# Patient Record
Sex: FEMALE | Race: WHITE | NOT HISPANIC OR LATINO
[De-identification: names, ages, dates, MRNs, and addresses within clinical notes are randomized per-mention and may not be internally consistent; named-entity substitution may affect disease eponyms.]

---

## 2018-02-12 ENCOUNTER — APPOINTMENT (OUTPATIENT)
Dept: GASTROENTEROLOGY | Facility: CLINIC | Age: 33
End: 2018-02-12
Payer: COMMERCIAL

## 2018-02-12 VITALS
RESPIRATION RATE: 14 BRPM | HEIGHT: 64 IN | DIASTOLIC BLOOD PRESSURE: 83 MMHG | BODY MASS INDEX: 34.49 KG/M2 | WEIGHT: 202 LBS | SYSTOLIC BLOOD PRESSURE: 139 MMHG | HEART RATE: 80 BPM

## 2018-02-12 DIAGNOSIS — A09 INFECTIOUS GASTROENTERITIS AND COLITIS, UNSPECIFIED: ICD-10-CM

## 2018-02-12 DIAGNOSIS — F17.210 NICOTINE DEPENDENCE, CIGARETTES, UNCOMPLICATED: ICD-10-CM

## 2018-02-12 DIAGNOSIS — Z80.51 FAMILY HISTORY OF MALIGNANT NEOPLASM OF KIDNEY: ICD-10-CM

## 2018-02-12 DIAGNOSIS — Z78.9 OTHER SPECIFIED HEALTH STATUS: ICD-10-CM

## 2018-02-12 PROCEDURE — 82272 OCCULT BLD FECES 1-3 TESTS: CPT

## 2018-02-12 PROCEDURE — 99204 OFFICE O/P NEW MOD 45 MIN: CPT

## 2018-02-13 LAB
ALBUMIN SERPL ELPH-MCNC: 4.3 G/DL
ALP BLD-CCNC: 68 U/L
ALT SERPL-CCNC: 16 U/L
ANION GAP SERPL CALC-SCNC: 11 MMOL/L
AST SERPL-CCNC: 19 U/L
BILIRUB SERPL-MCNC: 0.4 MG/DL
BUN SERPL-MCNC: 12 MG/DL
CALCIUM SERPL-MCNC: 9.2 MG/DL
CHLORIDE SERPL-SCNC: 102 MMOL/L
CO2 SERPL-SCNC: 26 MMOL/L
CREAT SERPL-MCNC: 0.78 MG/DL
CRP SERPL-MCNC: <0.2 MG/DL
ERYTHROCYTE [SEDIMENTATION RATE] IN BLOOD BY WESTERGREN METHOD: 17 MM/HR
GLUCOSE SERPL-MCNC: 90 MG/DL
IGA SER QL IEP: 176 MG/DL
POTASSIUM SERPL-SCNC: 4.4 MMOL/L
PROT SERPL-MCNC: 7 G/DL
SODIUM SERPL-SCNC: 139 MMOL/L
TSH SERPL-ACNC: 2.72 UIU/ML

## 2018-02-14 LAB
ENDOMYSIUM IGA SER QL: NEGATIVE
ENDOMYSIUM IGA TITR SER: NORMAL

## 2018-02-15 ENCOUNTER — OTHER (OUTPATIENT)
Age: 33
End: 2018-02-15

## 2018-02-15 ENCOUNTER — RX RENEWAL (OUTPATIENT)
Age: 33
End: 2018-02-15

## 2018-02-15 DIAGNOSIS — A04.72 ENTEROCOLITIS DUE TO CLOSTRIDIUM DIFFICILE, NOT SPECIFIED AS RECURRENT: ICD-10-CM

## 2018-02-15 LAB
C DIFF TOX GENS STL QL NAA+PROBE: NORMAL
CDIFF BY PCR: DETECTED
GLIADIN IGA SER QL: <5 UNITS
GLIADIN IGG SER QL: <5 UNITS
GLIADIN PEPTIDE IGA SER-ACNC: NEGATIVE
GLIADIN PEPTIDE IGG SER-ACNC: NEGATIVE
TTG IGA SER IA-ACNC: 12.4 UNITS
TTG IGA SER-ACNC: NEGATIVE
TTG IGG SER IA-ACNC: <5 UNITS
TTG IGG SER IA-ACNC: NEGATIVE

## 2018-02-16 LAB
DEPRECATED O AND P PREP STL: NORMAL
G LAMBLIA AG STL QL: NORMAL

## 2018-02-25 LAB
ANNOTATION COMMENT IMP: NORMAL
BACTERIA STL CULT: NORMAL
HLA-DQ2: NEGATIVE
HLA-DQ8 QL: NEGATIVE
REF LAB TEST METHOD: NORMAL

## 2018-02-26 ENCOUNTER — OTHER (OUTPATIENT)
Age: 33
End: 2018-02-26

## 2018-02-26 ENCOUNTER — EMERGENCY (EMERGENCY)
Facility: HOSPITAL | Age: 33
LOS: 1 days | Discharge: HOME | End: 2018-02-26
Attending: EMERGENCY MEDICINE | Admitting: EMERGENCY MEDICINE
Payer: COMMERCIAL

## 2018-02-26 VITALS
TEMPERATURE: 99 F | HEART RATE: 90 BPM | RESPIRATION RATE: 18 BRPM | SYSTOLIC BLOOD PRESSURE: 127 MMHG | DIASTOLIC BLOOD PRESSURE: 78 MMHG | OXYGEN SATURATION: 98 %

## 2018-02-26 VITALS
SYSTOLIC BLOOD PRESSURE: 132 MMHG | WEIGHT: 184.97 LBS | HEART RATE: 97 BPM | DIASTOLIC BLOOD PRESSURE: 61 MMHG | OXYGEN SATURATION: 96 % | TEMPERATURE: 98 F

## 2018-02-26 DIAGNOSIS — R19.7 DIARRHEA, UNSPECIFIED: ICD-10-CM

## 2018-02-26 DIAGNOSIS — Z79.899 OTHER LONG TERM (CURRENT) DRUG THERAPY: ICD-10-CM

## 2018-02-26 DIAGNOSIS — A04.72 ENTEROCOLITIS DUE TO CLOSTRIDIUM DIFFICILE, NOT SPECIFIED AS RECURRENT: ICD-10-CM

## 2018-02-26 DIAGNOSIS — R63.4 ABNORMAL WEIGHT LOSS: ICD-10-CM

## 2018-02-26 DIAGNOSIS — R10.13 EPIGASTRIC PAIN: ICD-10-CM

## 2018-02-26 DIAGNOSIS — Z79.891 LONG TERM (CURRENT) USE OF OPIATE ANALGESIC: ICD-10-CM

## 2018-02-26 DIAGNOSIS — Z79.1 LONG TERM (CURRENT) USE OF NON-STEROIDAL ANTI-INFLAMMATORIES (NSAID): ICD-10-CM

## 2018-02-26 DIAGNOSIS — R11.2 NAUSEA WITH VOMITING, UNSPECIFIED: ICD-10-CM

## 2018-02-26 LAB
ALBUMIN SERPL ELPH-MCNC: 4.2 G/DL — SIGNIFICANT CHANGE UP (ref 3.3–5.2)
ALP SERPL-CCNC: 57 U/L — SIGNIFICANT CHANGE UP (ref 40–120)
ALT FLD-CCNC: 18 U/L — SIGNIFICANT CHANGE UP
ANION GAP SERPL CALC-SCNC: 13 MMOL/L — SIGNIFICANT CHANGE UP (ref 5–17)
APPEARANCE UR: CLEAR — SIGNIFICANT CHANGE UP
AST SERPL-CCNC: 18 U/L — SIGNIFICANT CHANGE UP
BACTERIA # UR AUTO: ABNORMAL
BASOPHILS # BLD AUTO: 0 K/UL — SIGNIFICANT CHANGE UP (ref 0–0.2)
BASOPHILS NFR BLD AUTO: 0.5 % — SIGNIFICANT CHANGE UP (ref 0–2)
BILIRUB SERPL-MCNC: 0.2 MG/DL — LOW (ref 0.4–2)
BILIRUB UR-MCNC: NEGATIVE — SIGNIFICANT CHANGE UP
BUN SERPL-MCNC: 9 MG/DL — SIGNIFICANT CHANGE UP (ref 8–20)
CALCIUM SERPL-MCNC: 8.9 MG/DL — SIGNIFICANT CHANGE UP (ref 8.6–10.2)
CHLORIDE SERPL-SCNC: 103 MMOL/L — SIGNIFICANT CHANGE UP (ref 98–107)
CO2 SERPL-SCNC: 24 MMOL/L — SIGNIFICANT CHANGE UP (ref 22–29)
COLOR SPEC: YELLOW — SIGNIFICANT CHANGE UP
CREAT SERPL-MCNC: 0.59 MG/DL — SIGNIFICANT CHANGE UP (ref 0.5–1.3)
DIFF PNL FLD: NEGATIVE — SIGNIFICANT CHANGE UP
EOSINOPHIL # BLD AUTO: 0 K/UL — SIGNIFICANT CHANGE UP (ref 0–0.5)
EOSINOPHIL NFR BLD AUTO: 0.7 % — SIGNIFICANT CHANGE UP (ref 0–6)
EPI CELLS # UR: SIGNIFICANT CHANGE UP
GLUCOSE SERPL-MCNC: 92 MG/DL — SIGNIFICANT CHANGE UP (ref 70–115)
GLUCOSE UR QL: NEGATIVE MG/DL — SIGNIFICANT CHANGE UP
HCG UR QL: NEGATIVE — SIGNIFICANT CHANGE UP
HCT VFR BLD CALC: 42.5 % — SIGNIFICANT CHANGE UP (ref 37–47)
HGB BLD-MCNC: 13.8 G/DL — SIGNIFICANT CHANGE UP (ref 12–16)
KETONES UR-MCNC: NEGATIVE — SIGNIFICANT CHANGE UP
LEUKOCYTE ESTERASE UR-ACNC: ABNORMAL
LYMPHOCYTES # BLD AUTO: 1.4 K/UL — SIGNIFICANT CHANGE UP (ref 1–4.8)
LYMPHOCYTES # BLD AUTO: 25 % — SIGNIFICANT CHANGE UP (ref 20–55)
MCHC RBC-ENTMCNC: 27.7 PG — SIGNIFICANT CHANGE UP (ref 27–31)
MCHC RBC-ENTMCNC: 32.5 G/DL — SIGNIFICANT CHANGE UP (ref 32–36)
MCV RBC AUTO: 85.3 FL — SIGNIFICANT CHANGE UP (ref 81–99)
MONOCYTES # BLD AUTO: 0.4 K/UL — SIGNIFICANT CHANGE UP (ref 0–0.8)
MONOCYTES NFR BLD AUTO: 6.8 % — SIGNIFICANT CHANGE UP (ref 3–10)
NEUTROPHILS # BLD AUTO: 3.7 K/UL — SIGNIFICANT CHANGE UP (ref 1.8–8)
NEUTROPHILS NFR BLD AUTO: 67 % — SIGNIFICANT CHANGE UP (ref 37–73)
NITRITE UR-MCNC: NEGATIVE — SIGNIFICANT CHANGE UP
PH UR: 7 — SIGNIFICANT CHANGE UP (ref 5–8)
PLATELET # BLD AUTO: 237 K/UL — SIGNIFICANT CHANGE UP (ref 150–400)
POTASSIUM SERPL-MCNC: 4.3 MMOL/L — SIGNIFICANT CHANGE UP (ref 3.5–5.3)
POTASSIUM SERPL-SCNC: 4.3 MMOL/L — SIGNIFICANT CHANGE UP (ref 3.5–5.3)
PROT SERPL-MCNC: 6.9 G/DL — SIGNIFICANT CHANGE UP (ref 6.6–8.7)
PROT UR-MCNC: NEGATIVE MG/DL — SIGNIFICANT CHANGE UP
RBC # BLD: 4.98 M/UL — SIGNIFICANT CHANGE UP (ref 4.4–5.2)
RBC # FLD: 13.6 % — SIGNIFICANT CHANGE UP (ref 11–15.6)
RBC CASTS # UR COMP ASSIST: NEGATIVE /HPF — SIGNIFICANT CHANGE UP (ref 0–4)
SODIUM SERPL-SCNC: 140 MMOL/L — SIGNIFICANT CHANGE UP (ref 135–145)
SP GR SPEC: 1.01 — SIGNIFICANT CHANGE UP (ref 1.01–1.02)
UROBILINOGEN FLD QL: NEGATIVE MG/DL — SIGNIFICANT CHANGE UP
WBC # BLD: 5.6 K/UL — SIGNIFICANT CHANGE UP (ref 4.8–10.8)
WBC # FLD AUTO: 5.6 K/UL — SIGNIFICANT CHANGE UP (ref 4.8–10.8)
WBC UR QL: SIGNIFICANT CHANGE UP

## 2018-02-26 PROCEDURE — 81001 URINALYSIS AUTO W/SCOPE: CPT

## 2018-02-26 PROCEDURE — 99284 EMERGENCY DEPT VISIT MOD MDM: CPT | Mod: 25

## 2018-02-26 PROCEDURE — 96374 THER/PROPH/DIAG INJ IV PUSH: CPT

## 2018-02-26 PROCEDURE — 99284 EMERGENCY DEPT VISIT MOD MDM: CPT

## 2018-02-26 PROCEDURE — 85027 COMPLETE CBC AUTOMATED: CPT

## 2018-02-26 PROCEDURE — 80053 COMPREHEN METABOLIC PANEL: CPT

## 2018-02-26 PROCEDURE — 81025 URINE PREGNANCY TEST: CPT

## 2018-02-26 PROCEDURE — 36415 COLL VENOUS BLD VENIPUNCTURE: CPT

## 2018-02-26 RX ORDER — VANCOMYCIN HCL 1 G
1 VIAL (EA) INTRAVENOUS
Qty: 40 | Refills: 0
Start: 2018-02-26 | End: 2018-03-07

## 2018-02-26 RX ORDER — SUCRALFATE 1 G
1 TABLET ORAL ONCE
Qty: 0 | Refills: 0 | Status: COMPLETED | OUTPATIENT
Start: 2018-02-26 | End: 2018-02-26

## 2018-02-26 RX ORDER — ONDANSETRON 8 MG/1
1 TABLET, FILM COATED ORAL
Qty: 8 | Refills: 0
Start: 2018-02-26

## 2018-02-26 RX ORDER — PANTOPRAZOLE SODIUM 20 MG/1
40 TABLET, DELAYED RELEASE ORAL ONCE
Qty: 0 | Refills: 0 | Status: COMPLETED | OUTPATIENT
Start: 2018-02-26 | End: 2018-02-26

## 2018-02-26 RX ORDER — SODIUM CHLORIDE 9 MG/ML
1000 INJECTION INTRAMUSCULAR; INTRAVENOUS; SUBCUTANEOUS ONCE
Qty: 0 | Refills: 0 | Status: COMPLETED | OUTPATIENT
Start: 2018-02-26 | End: 2018-02-26

## 2018-02-26 RX ORDER — TRAMADOL HYDROCHLORIDE 50 MG/1
50 TABLET ORAL ONCE
Qty: 0 | Refills: 0 | Status: DISCONTINUED | OUTPATIENT
Start: 2018-02-26 | End: 2018-02-26

## 2018-02-26 RX ADMIN — PANTOPRAZOLE SODIUM 40 MILLIGRAM(S): 20 TABLET, DELAYED RELEASE ORAL at 10:55

## 2018-02-26 RX ADMIN — Medication 1 GRAM(S): at 14:14

## 2018-02-26 RX ADMIN — SODIUM CHLORIDE 1000 MILLILITER(S): 9 INJECTION INTRAMUSCULAR; INTRAVENOUS; SUBCUTANEOUS at 10:51

## 2018-02-26 RX ADMIN — TRAMADOL HYDROCHLORIDE 50 MILLIGRAM(S): 50 TABLET ORAL at 14:14

## 2018-02-26 NOTE — ED STATDOCS - OBJECTIVE STATEMENT
31 y/o F w/ PMHx of kidney stones and gall stones presents to ED c/o epigastric abd pain, 2 episodes of vomiting, 5 episodes of diarrhea. Pt dx with C. diff 2 weeks ago, she finished flagyl (Rx by Dr. Hester). She woke up this morning with severe abd pain. She reports sx of diarrhea, bloody stools, mild abd pain and 25 lb weight loss which began a few months before being dx with C.diff. She reports current abd pain is similar in character to previous episode. Denies coffee-ground emesis, hemoptysis, fever, chills or any other complaints at this time. No PCP. 31 y/o F w/ PMHx of kidney stones and gall stones presents to ED c/o epigastric abd pain, nausea, 2 episodes of vomiting, 5 episodes of diarrhea since this AM. Pt dx with C. diff 2 weeks ago, she finished flagyl (Rx by Dr. Hester). She reports sx of diarrhea, bloody stools, mild abd pain and 25 lb weight loss which began a few months before being dx with C.diff. Pt notes current abd pain is similar in character to previous episode. Denies coffee-ground emesis, hemoptysis, fever, chills or any other complaints at this time. No PCP at this time. 31 y/o F w/ PMHx of kidney stones and gall stones presents to ED c/o epigastric abd pain, nausea, 2 episodes of vomiting, 5 episodes of foul-smelling diarrhea since this AM. Pt dx with C. diff 2 weeks ago, she finished course of flagyl yesterday(Rx by Dr. Hester). She reports sx of diarrhea, bloody stools, mild abd pain and 25 lb weight loss which began a few months before being dx with C.diff.  Pt notes current abd pain is similar in character to previous episode. Denies coffee-ground emesis, hematemesis, fever, chills or any other complaints at this time. No PCP at this time.

## 2018-02-26 NOTE — ED STATDOCS - PROGRESS NOTE DETAILS
Still with epigastric abd pain:  will treat with tramadol and carafate.  Awaitng callback from dr goff case d/w dr goff: recommends treatment with oral vanco QID, low fiber diet and follow-up in office in 10 days

## 2018-02-26 NOTE — ED STATDOCS - CARE PLAN
Principal Discharge DX:	C. difficile colitis  Assessment and plan of treatment:	take vancomycin as prescribed four times a day.  Zofran as needed for nausea.  Low fiber diet.

## 2018-03-12 ENCOUNTER — APPOINTMENT (OUTPATIENT)
Dept: GASTROENTEROLOGY | Facility: CLINIC | Age: 33
End: 2018-03-12
Payer: COMMERCIAL

## 2018-03-12 VITALS
SYSTOLIC BLOOD PRESSURE: 133 MMHG | WEIGHT: 204 LBS | HEIGHT: 64 IN | HEART RATE: 78 BPM | BODY MASS INDEX: 34.83 KG/M2 | DIASTOLIC BLOOD PRESSURE: 78 MMHG

## 2018-03-12 PROCEDURE — 99214 OFFICE O/P EST MOD 30 MIN: CPT

## 2018-08-21 ENCOUNTER — RESULT REVIEW (OUTPATIENT)
Age: 33
End: 2018-08-21

## 2019-08-27 ENCOUNTER — RESULT REVIEW (OUTPATIENT)
Age: 34
End: 2019-08-27

## 2019-09-21 ENCOUNTER — TRANSCRIPTION ENCOUNTER (OUTPATIENT)
Age: 34
End: 2019-09-21

## 2019-12-05 ENCOUNTER — OUTPATIENT (OUTPATIENT)
Dept: OUTPATIENT SERVICES | Facility: HOSPITAL | Age: 34
LOS: 1 days | End: 2019-12-05
Payer: COMMERCIAL

## 2019-12-05 ENCOUNTER — APPOINTMENT (OUTPATIENT)
Dept: MRI IMAGING | Facility: CLINIC | Age: 34
End: 2019-12-05
Payer: COMMERCIAL

## 2019-12-05 DIAGNOSIS — Z00.00 ENCOUNTER FOR GENERAL ADULT MEDICAL EXAMINATION WITHOUT ABNORMAL FINDINGS: ICD-10-CM

## 2019-12-05 PROCEDURE — 72148 MRI LUMBAR SPINE W/O DYE: CPT

## 2019-12-05 PROCEDURE — 72148 MRI LUMBAR SPINE W/O DYE: CPT | Mod: 26

## 2020-03-31 ENCOUNTER — TRANSCRIPTION ENCOUNTER (OUTPATIENT)
Age: 35
End: 2020-03-31

## 2020-12-10 ENCOUNTER — APPOINTMENT (OUTPATIENT)
Dept: PHYSICAL MEDICINE AND REHAB | Facility: CLINIC | Age: 35
End: 2020-12-10
Payer: COMMERCIAL

## 2020-12-10 VITALS
BODY MASS INDEX: 33.32 KG/M2 | HEIGHT: 65 IN | TEMPERATURE: 97.1 F | WEIGHT: 200 LBS | DIASTOLIC BLOOD PRESSURE: 85 MMHG | SYSTOLIC BLOOD PRESSURE: 132 MMHG

## 2020-12-10 PROCEDURE — 99203 OFFICE O/P NEW LOW 30 MIN: CPT

## 2020-12-10 PROCEDURE — 99072 ADDL SUPL MATRL&STAF TM PHE: CPT

## 2020-12-10 RX ORDER — VANCOMYCIN HYDROCHLORIDE 125 MG/1
125 CAPSULE ORAL 4 TIMES DAILY
Qty: 52 | Refills: 0 | Status: DISCONTINUED | COMMUNITY
Start: 2018-02-26 | End: 2020-12-10

## 2020-12-10 RX ORDER — METRONIDAZOLE 500 MG/1
500 TABLET ORAL 3 TIMES DAILY
Qty: 30 | Refills: 0 | Status: DISCONTINUED | COMMUNITY
Start: 2018-02-15 | End: 2020-12-10

## 2020-12-10 NOTE — ASSESSMENT
[FreeTextEntry1] : NAHOMY JOHNS,  a 35 year-old female here with lumbar radiculopathy.\par Discussed all potential treatment options including PT, oral medications, and interventional procedures\par - will trial medrol - advised to stop all NSAID use during taper, and resume PRN after.  Cautioned in regards to chronic oral steroids and NSAIDs with risk of GI/cardiac/renal toxicities\par - Start PT as tolerated \par - WIll order for MRI - last MRI was 12/2019 at the time of 1st fall, however has different presentation now with neurological deficits after fall in 9/2020\par - briefly discuss interventional procedure - MO[facility]STO \par - Will review MRI result via phone

## 2020-12-10 NOTE — DATA REVIEWED
[MRI] : MRI [FreeTextEntry1] : Lumbar MRI - 12/2019\par Lumbar alignment is maintained. Vertebral bodies are normal in height and \par signal. Intervertebral disc spaces are normal in height and signal. No \par significant spinal canal or neuroforaminal narrowing. \par The conus is normal in position and morphology at the T12-L1 level. \par IMPRESSION: No bone marrow edema. Lumbar alignment is maintained.

## 2020-12-10 NOTE — PHYSICAL EXAM
[FreeTextEntry1] : General: NAD, alert and oriented x 3\par Psych: normal affect, intact judgment and insight\par HEENT: NC/AT, normal visual tracking\par Pulmonary: normal respiratory effort, chest expansion appears symmetrical\par CV: extremities appear pink and well perfused\par Abd: non-distended\par Ext: no c/c/e\par skin: normal color, appearance, and temperature\par \par Lumbar/Hip Spine:\par Gait - non-antalgic, able to heel and toe walk\par Inspection: normal muscle bulk without asymmetry\par Palpation: TTP over left lumbar paraspinal\par ROM lumbar: 1/2 flexion before pain, extension with minimal discomfort\par MMT: 5/5 bilateral lower extremities - except LEFT 4+/5 HF, 5-/5 KE\par Reflexes: symmetric bilateral patella and ankle jerk\par Sensory: intact to light touch in all dermatomes of the bilateral lower extremities\par Provocative testing:\par Facet loading - negative\par SLR +Pos left\par HARLEEN/FADIR - negative

## 2020-12-10 NOTE — HISTORY OF PRESENT ILLNESS
[FreeTextEntry1] : Ms. NAHOMY JOHNS is a 35 year old female with \par \par Location: LLE > low back\par Inciting Event: initial fall 12/2019 with soreness but resolved.  Slip and fell again 9/2020 now with radicular pain and weakness\par Onset: acute\par Frequency: fairly constant\par Quality: sharp, shooting, throbbing\par Severity: 8-10/10\par Aggravating Factor: bending, lifting, walking, prolong sitting\par Relieving factor: rest (mildly helps)\par Radiation: left L5 distribution\par Numbness/Tingling: same distribution\par Cough/Sneezing: increased pain\par Bowel/Bladder incontinence: denies - no saddle anesthesia either\par Extremity weakness: left leg - buckling + near falls\par Conservative tx: OTC NSAID - no benefits.

## 2020-12-13 ENCOUNTER — APPOINTMENT (OUTPATIENT)
Dept: MRI IMAGING | Facility: CLINIC | Age: 35
End: 2020-12-13
Payer: COMMERCIAL

## 2020-12-13 ENCOUNTER — OUTPATIENT (OUTPATIENT)
Dept: OUTPATIENT SERVICES | Facility: HOSPITAL | Age: 35
LOS: 1 days | End: 2020-12-13
Payer: COMMERCIAL

## 2020-12-13 DIAGNOSIS — M54.16 RADICULOPATHY, LUMBAR REGION: ICD-10-CM

## 2020-12-13 DIAGNOSIS — Z00.8 ENCOUNTER FOR OTHER GENERAL EXAMINATION: ICD-10-CM

## 2020-12-13 PROCEDURE — 72148 MRI LUMBAR SPINE W/O DYE: CPT | Mod: 26

## 2020-12-13 PROCEDURE — 72148 MRI LUMBAR SPINE W/O DYE: CPT

## 2020-12-17 ENCOUNTER — APPOINTMENT (OUTPATIENT)
Dept: PHYSICAL MEDICINE AND REHAB | Facility: CLINIC | Age: 35
End: 2020-12-17
Payer: COMMERCIAL

## 2020-12-17 VITALS
TEMPERATURE: 97.6 F | SYSTOLIC BLOOD PRESSURE: 141 MMHG | WEIGHT: 200 LBS | DIASTOLIC BLOOD PRESSURE: 82 MMHG | HEIGHT: 65 IN | BODY MASS INDEX: 33.32 KG/M2

## 2020-12-17 PROCEDURE — 99072 ADDL SUPL MATRL&STAF TM PHE: CPT

## 2020-12-17 PROCEDURE — 99214 OFFICE O/P EST MOD 30 MIN: CPT

## 2020-12-17 RX ORDER — GABAPENTIN 300 MG/1
300 CAPSULE ORAL 3 TIMES DAILY
Qty: 90 | Refills: 2 | Status: ACTIVE | COMMUNITY
Start: 2020-12-17 | End: 1900-01-01

## 2020-12-22 NOTE — ASSESSMENT
[FreeTextEntry1] : Left L4/5, L5/S1 TFESI with Kenalog. \par \par \par Epidural steroid injection is medically necessary given the persistent and severe low back pain. Patient complains of radicular pain down to left leg. MRI scan is consistent with Lumbar Radiculopathy. The symptom greatly affect patient's daily life and function. Patient's symptom and physical examination are consistent with the diagnosis of lumbar radiculopathy. Patient has failed physician guided conservative treatments for over 6 weeks therefore an epidural steroid injection is the next step in management of the patient's symptoms.\par

## 2020-12-22 NOTE — PHYSICAL EXAM
[FreeTextEntry1] : General: NAD, alert and oriented x 3\par Psych: normal affect, intact judgment and insight\par HEENT: NC/AT, normal visual tracking\par Pulmonary: normal respiratory effort, chest expansion appears symmetrical\par CV: extremities appear pink and well perfused\par Abd: non-distended\par Ext: no c/c/e\par skin: normal color, appearance, and temperature\par \par Lumbar/Hip Spine:\par Gait - non-antalgic, able to heel and toe walk\par Inspection: normal muscle bulk without asymmetry\par Palpation: TTP over left lumbar paraspinal\par ROM lumbar: 1/2 flexion before pain, extension with minimal discomfort\par MMT: 5/5 bilateral lower extremities - except LEFT 4+/5 HF, 5-/5 KE\par Reflexes: symmetric bilateral patella and ankle jerk\par Sensory: intact to light touch in all dermatomes of the bilateral lower extremities\par Provocative testing:\par Facet loading - negative\par SLR +Pos left\par HARLEEN/FADIR - negative.

## 2020-12-22 NOTE — HISTORY OF PRESENT ILLNESS
[FreeTextEntry1] : Patient presents for follow up. Medrol dose miko did not help. Pain is still severe. Going down LLE. Pain is a 6-9/10. Worst with activity. MRI done. Reviewed. L4/5 herniated disc with left L5 nerve impingement.

## 2020-12-26 ENCOUNTER — APPOINTMENT (OUTPATIENT)
Dept: DISASTER EMERGENCY | Facility: CLINIC | Age: 35
End: 2020-12-26

## 2020-12-26 DIAGNOSIS — Z01.818 ENCOUNTER FOR OTHER PREPROCEDURAL EXAMINATION: ICD-10-CM

## 2020-12-27 LAB — SARS-COV-2 N GENE NPH QL NAA+PROBE: NOT DETECTED

## 2020-12-28 ENCOUNTER — TRANSCRIPTION ENCOUNTER (OUTPATIENT)
Age: 35
End: 2020-12-28

## 2020-12-29 ENCOUNTER — APPOINTMENT (OUTPATIENT)
Dept: PHYSICAL MEDICINE AND REHAB | Facility: GI CENTER | Age: 35
End: 2020-12-29
Payer: COMMERCIAL

## 2020-12-29 ENCOUNTER — OUTPATIENT (OUTPATIENT)
Dept: OUTPATIENT SERVICES | Facility: HOSPITAL | Age: 35
LOS: 1 days | End: 2020-12-29
Payer: COMMERCIAL

## 2020-12-29 DIAGNOSIS — M54.16 RADICULOPATHY, LUMBAR REGION: ICD-10-CM

## 2020-12-29 PROCEDURE — 64483 NJX AA&/STRD TFRM EPI L/S 1: CPT | Mod: LT

## 2020-12-29 PROCEDURE — 64484 NJX AA&/STRD TFRM EPI L/S EA: CPT | Mod: LT

## 2020-12-29 PROCEDURE — 76000 FLUOROSCOPY <1 HR PHYS/QHP: CPT

## 2021-01-14 ENCOUNTER — APPOINTMENT (OUTPATIENT)
Dept: PHYSICAL MEDICINE AND REHAB | Facility: CLINIC | Age: 36
End: 2021-01-14
Payer: COMMERCIAL

## 2021-01-14 VITALS
TEMPERATURE: 97.2 F | BODY MASS INDEX: 33.32 KG/M2 | HEIGHT: 65 IN | DIASTOLIC BLOOD PRESSURE: 97 MMHG | HEART RATE: 74 BPM | SYSTOLIC BLOOD PRESSURE: 138 MMHG | WEIGHT: 200 LBS

## 2021-01-14 DIAGNOSIS — Z00.00 ENCOUNTER FOR GENERAL ADULT MEDICAL EXAMINATION W/OUT ABNORMAL FINDINGS: ICD-10-CM

## 2021-01-14 PROCEDURE — 99213 OFFICE O/P EST LOW 20 MIN: CPT

## 2021-01-14 PROCEDURE — 99072 ADDL SUPL MATRL&STAF TM PHE: CPT

## 2021-01-19 ENCOUNTER — APPOINTMENT (OUTPATIENT)
Dept: DISASTER EMERGENCY | Facility: CLINIC | Age: 36
End: 2021-01-19

## 2021-01-20 NOTE — HISTORY OF PRESENT ILLNESS
[FreeTextEntry1] : Patient presents for follow up and reports significant improvement in pain for about 2 days, then pain returned to pre injection level. \par \par Prior visit:\par Patient presents for follow up. Medrol dose miko did not help. Pain is still severe. Going down LLE. Pain is a 6-9/10. Worst with activity. MRI done. Reviewed. L4/5 herniated disc with left L5 nerve impingement. \par

## 2021-01-20 NOTE — PHYSICAL EXAM
[FreeTextEntry1] : General: NAD, alert and oriented x 3\par Psych: normal affect, intact judgment and insight\par HEENT: NC/AT, normal visual tracking\par Pulmonary: normal respiratory effort, chest expansion appears symmetrical\par CV: extremities appear pink and well perfused\par Abd: non-distended\par Ext: no c/c/e\par skin: normal color, appearance, and temperature\par \par Lumbar/Hip Spine:\par Gait - non-antalgic, able to heel and toe walk\par Inspection: normal muscle bulk without asymmetry\par Palpation: TTP over left lumbar paraspinal\par ROM lumbar: 1/2 flexion before pain, extension with minimal discomfort\par MMT: 5/5 bilateral lower extremities - except LEFT 4+/5 HF, 5-/5 KE\par Reflexes: symmetric bilateral patella and ankle jerk\par Sensory: intact to light touch in all dermatomes of the bilateral lower extremities\par Provocative testing:\par Facet loading - negative\par SLR +Pos left\par HARLEEN/FADIR - negative. \par

## 2021-01-26 ENCOUNTER — APPOINTMENT (OUTPATIENT)
Dept: DISASTER EMERGENCY | Facility: CLINIC | Age: 36
End: 2021-01-26

## 2021-01-27 ENCOUNTER — TRANSCRIPTION ENCOUNTER (OUTPATIENT)
Age: 36
End: 2021-01-27

## 2021-01-28 ENCOUNTER — TRANSCRIPTION ENCOUNTER (OUTPATIENT)
Age: 36
End: 2021-01-28

## 2021-01-29 ENCOUNTER — APPOINTMENT (OUTPATIENT)
Dept: PHYSICAL MEDICINE AND REHAB | Facility: GI CENTER | Age: 36
End: 2021-01-29

## 2021-03-09 ENCOUNTER — APPOINTMENT (OUTPATIENT)
Dept: PHYSICAL MEDICINE AND REHAB | Facility: CLINIC | Age: 36
End: 2021-03-09
Payer: COMMERCIAL

## 2021-03-09 VITALS
BODY MASS INDEX: 33.29 KG/M2 | HEIGHT: 64 IN | OXYGEN SATURATION: 99 % | HEART RATE: 92 BPM | SYSTOLIC BLOOD PRESSURE: 128 MMHG | DIASTOLIC BLOOD PRESSURE: 82 MMHG | WEIGHT: 195 LBS | TEMPERATURE: 98.9 F | RESPIRATION RATE: 14 BRPM

## 2021-03-09 VITALS — TEMPERATURE: 98.9 F

## 2021-03-09 PROCEDURE — 99072 ADDL SUPL MATRL&STAF TM PHE: CPT

## 2021-03-09 PROCEDURE — 99214 OFFICE O/P EST MOD 30 MIN: CPT

## 2021-03-09 RX ORDER — METHYLPREDNISOLONE 4 MG/1
4 TABLET ORAL
Qty: 1 | Refills: 0 | Status: DISCONTINUED | COMMUNITY
Start: 2020-12-10 | End: 2021-03-09

## 2021-03-09 NOTE — PHYSICAL EXAM
[FreeTextEntry1] : NAD\par A&Ox3\par Non-obese\par ROM L-spine: 1/2 full forward flexion before pain; 15-20' extension w/ pain into left leg\par ROM Hips: smooth IR/ER w/o pain\par Pelvic tilt: ++\par Seated slump test: +/- left\par SLR: +/- left\par DTR's: 2+ knees/ankles\par MMT: 5-/5 LEFT TA; 5/5 PF\par Sensation: SILT\par Toe & Heel Walk: Slight difficulty heel walking left ankle\par  \par

## 2021-03-09 NOTE — HISTORY OF PRESENT ILLNESS
[FreeTextEntry1] : 35 y.o. F w/ h/o LBP radiating down left leg presents to office to establish care.  Pt. was previously seeing Dr. Lewis who performed LEFT L4-5 + L5-S1 TFESI (12/29/20) w/o relief of sxs.  MRI L-spine previously done and reviewed below.  Pt. still c/o radiating pain emanating from left buttock down posterolateral aspect thigh past knee into lateral calf.  Endorses N/T in leg.

## 2021-03-09 NOTE — ASSESSMENT
[FreeTextEntry1] : 35 y.o. F w/ h/o LEFT L4-5 HNP -> LEFT L5 radiculopathy w/ slight DF weakness.  Rx P.T. for modalities, gentle ROM, stretching and strengthening exercises.  Discussed R/B/A to repeat LEFT L5-S1 TFESI under fluoroscopy which patient has agreed to.  She will be scheduled w/i next 2 weeks.  Further advised smoking cessation and weight loss.  RTC 2 weeks post procedure.

## 2021-03-09 NOTE — DATA REVIEWED
[MRI] : MRI [FreeTextEntry1] : MRI L-spine: Disc bulge with left paracentral disc protrusion at L4-L5 contributing to mild-to-moderate canal stenosis and mass effect on the descending left L5 nerve root.

## 2021-03-22 ENCOUNTER — APPOINTMENT (OUTPATIENT)
Dept: DISASTER EMERGENCY | Facility: CLINIC | Age: 36
End: 2021-03-22

## 2021-03-23 LAB — SARS-COV-2 N GENE NPH QL NAA+PROBE: NOT DETECTED

## 2021-03-24 ENCOUNTER — TRANSCRIPTION ENCOUNTER (OUTPATIENT)
Age: 36
End: 2021-03-24

## 2021-03-25 ENCOUNTER — OUTPATIENT (OUTPATIENT)
Dept: OUTPATIENT SERVICES | Facility: HOSPITAL | Age: 36
LOS: 1 days | End: 2021-03-25
Payer: COMMERCIAL

## 2021-03-25 ENCOUNTER — APPOINTMENT (OUTPATIENT)
Dept: PHYSICAL MEDICINE AND REHAB | Facility: GI CENTER | Age: 36
End: 2021-03-25
Payer: COMMERCIAL

## 2021-03-25 DIAGNOSIS — M54.16 RADICULOPATHY, LUMBAR REGION: ICD-10-CM

## 2021-03-25 PROCEDURE — 64483 NJX AA&/STRD TFRM EPI L/S 1: CPT | Mod: LT

## 2021-03-25 PROCEDURE — 64483 NJX AA&/STRD TFRM EPI L/S 1: CPT

## 2021-03-25 PROCEDURE — 76000 FLUOROSCOPY <1 HR PHYS/QHP: CPT

## 2021-04-13 ENCOUNTER — APPOINTMENT (OUTPATIENT)
Dept: PHYSICAL MEDICINE AND REHAB | Facility: CLINIC | Age: 36
End: 2021-04-13
Payer: COMMERCIAL

## 2021-04-13 VITALS
OXYGEN SATURATION: 99 % | BODY MASS INDEX: 33.29 KG/M2 | HEART RATE: 79 BPM | HEIGHT: 64 IN | WEIGHT: 195 LBS | RESPIRATION RATE: 14 BRPM | TEMPERATURE: 98 F | DIASTOLIC BLOOD PRESSURE: 88 MMHG | SYSTOLIC BLOOD PRESSURE: 131 MMHG

## 2021-04-13 PROCEDURE — 99072 ADDL SUPL MATRL&STAF TM PHE: CPT

## 2021-04-13 PROCEDURE — 99214 OFFICE O/P EST MOD 30 MIN: CPT

## 2021-04-13 NOTE — ASSESSMENT
[FreeTextEntry1] : 36 y.o. F w/ h/o LEFT L4-5 HNP -> LEFT L5 radiculopathy w/ slight DF weakness.  Pt. had short lived response to repeat LEFT L5-S1 TFESI but > 75%.  I spent most of today's office visit (25 min) discussing further non-operative vs. operative intervention for the patient's painful spinal condition.  I feel that a 3rd LESI is not a viable long-term solution for the patient's radicular pain.  She may be best served with a MLD but understands that she may still have some LBP.  As such, I have referred her to Ortho Spine (Dr. Smart) for consultation.   Again advised advised smoking cessation and weight loss.  RTC after above.

## 2021-04-13 NOTE — PHYSICAL EXAM
[FreeTextEntry1] : NAD\par A&Ox3\par Non-obese\par No significant change in today's examination\par ROM L-spine: 1/2 full forward flexion before pain; 15-20' extension w/ pain into left leg\par ROM Hips: smooth IR/ER w/o pain\par Pelvic tilt: ++\par Seated slump test: +/- left\par SLR: +/- left\par DTR's: 2+ knees/ankles\par MMT: 5-/5 LEFT TA; 5/5 PF (static)\par Sensation: SILT\par Toe & Heel Walk: Slight difficulty heel walking left ankle\par  \par

## 2021-04-23 ENCOUNTER — APPOINTMENT (OUTPATIENT)
Dept: ORTHOPEDIC SURGERY | Facility: CLINIC | Age: 36
End: 2021-04-23
Payer: COMMERCIAL

## 2021-04-23 VITALS
HEART RATE: 89 BPM | HEIGHT: 64 IN | TEMPERATURE: 98.1 F | SYSTOLIC BLOOD PRESSURE: 129 MMHG | WEIGHT: 198 LBS | BODY MASS INDEX: 33.8 KG/M2 | DIASTOLIC BLOOD PRESSURE: 87 MMHG

## 2021-04-23 DIAGNOSIS — M51.26 OTHER INTERVERTEBRAL DISC DISPLACEMENT, LUMBAR REGION: ICD-10-CM

## 2021-04-23 DIAGNOSIS — M54.16 RADICULOPATHY, LUMBAR REGION: ICD-10-CM

## 2021-04-23 DIAGNOSIS — M54.5 LOW BACK PAIN: ICD-10-CM

## 2021-04-23 PROCEDURE — 99204 OFFICE O/P NEW MOD 45 MIN: CPT

## 2021-04-23 PROCEDURE — 99072 ADDL SUPL MATRL&STAF TM PHE: CPT

## 2021-04-23 PROCEDURE — 72100 X-RAY EXAM L-S SPINE 2/3 VWS: CPT

## 2021-04-23 NOTE — ADDENDUM
[FreeTextEntry1] : Documented by Igor Rojo acting as a scribe for Dr. Wilfrid Smart on 04/23/2021. All medical record entries made by the Scribe were at my, Dr. Wilfrid Smart, direction and personally dictated by me on 04/23/2021 . I have reviewed the chart and agree that the record accurately reflects my personal performance of the history, physical exam, assessment and plan. I have also personally directed, reviewed, and agreed with the chart.

## 2021-04-23 NOTE — DISCUSSION/SUMMARY
[de-identified] : Prior to appointment and during encounter with patient extensive medical records were reviewed including but not limited to, hospital records, out patient records, imaging results, and lab data. During this appointment the patient was examined, diagnoses were discussed and explained in a face to face manner. In addition extensive time was spent reviewing aforementioned diagnostic studies. Counseling including abnormal image results, differential diagnoses, treatment options, risk and benefits, lifestyle changes, current condition, and current medications was performed. Patient's comments, questions, and concerns were address and patient verbalized understanding. Based on this patient's presentation at our office, which is an orthopedic spine surgeon's office, this patient inherently / intrinsically has a risk, however minute, of developing  issues such as Cauda equina syndrome, bowel and bladder changes, or progression of motor or neurological deficits such as paralysis which may be permanent.\par \par With 1 year of lumbar radiculopathy and a progressive drop foot I recommend an L4-L5 left diskectomy. Patient agrees with this plan secondary to 1 year of failed conservative care. Anatomic models, Xrays, CT scans/MRI’s were utilized to provide a firm understanding of their surgical plan. Patient is aware that surgery is elective in nature and he choosing to proceed with surgery. Risks, benefits, alternatives were discussed and all questions, comments and concerns were encouraged and answered to the patient's satisfaction. The statistical probability of improvement was discussed at length as well as post surgical course. Literature from [employer] spine society was provided to the patient regarding the specific type of surgery as well as a 5 page written surgical consent which the patient will need to sign and return to the office prior to surgical date. Consent forms highlight specific complications related to the complex nature of spinal surgery\par  \par Risks of lumbar surgery include: persistent pain, adjacent segment disease (which will require more surgery in future), dural tears, neurologic injury, and wound healing complications\par  \par Benefits of lumbar surgery include Improved neurologic and pain score\par  \par We also discussed with the patient complications of incisions directly related to obesity, diabetes, previous wound complications or post-surgical wound infections, smoking, neuropathy, and chronic anticoagulation. This risk has been specifically discussed and the patient will discuss modifiable risk factors to be optimized prior to surgical management. A multimodality approach of primary care physician, and medicine subspecialist will be utilized to optimize medical risk factors.\par  \par If patient is a smoker, discontinuation of smoking was advised and must be accomplished 6-8 weeks prior to surgery date. Patient was advised that help with quitting smoking is available through [facility] Smoker's Quit Line and phone number/website was provided, or patient can ask assistance from primary care provider. Elective surgery will not be performed unless patient complies with this policy.\par \par Medical comorbidities including but not limited to diabetes, coronary artery disease, renal insufficiency, uncontrolled hypertension, rheumatoid arthritis, auto-immune disorders, COPD/asthma and/or history of radiation, chemotherapy, being on anticoagulants, chronic steroids, immune modulators, have increased statistical chance of leading to increased hospital stay, protracted recovery period, need for acute or subacute rehabilitation post-operative. There can be increased probability of post-surgical and medical complications including but not limited to surgical site infection, need for revision surgery, deep vein thrombosis, pulmonary embolism, exacerbation of COPD/asthma, pneumonia, UTI, urinary retention, and ileus. \par

## 2021-04-23 NOTE — HISTORY OF PRESENT ILLNESS
[de-identified] : 36 year old F presents for an initial evaluation after a slip and fall in summer 2020, since this fall she has had LLE pain. She was diagnosed with a herniated disc. She completed oral medication, injection therapy, and PT. She states the sciatica is getting worse and she is developing a left foot drop. She wishes to discuss surgery, she was referred for this conversation by physiatry.  [Ataxia] : no ataxia [Incontinence] : no incontinence [Loss of Dexterity] : good dexterity [Urinary Ret.] : no urinary retention

## 2021-04-23 NOTE — PHYSICAL EXAM
[Valsalva Test] : positive Valsalva test [de-identified] : CONSTITUTIONAL: The patient is a very pleasant individual who is well-nourished and who appears stated age.\par PSYCHIATRIC: The patient is alert and oriented X 3 and in no apparent distress, and participates with orthopedic evaluation well.\par HEAD: Atraumatic and is nonsyndromic in appearance.\par EENT: No visible thyromegaly, EOMI.\par RESPIRATORY: Respiratory rate is regular, not dyspneic on examination.\par LYMPHATICS: There is no inguinal lymphadenopathy\par INTEGUMENTARY: Skin is clean, dry, and intact about the bilateral lower extremities and lumbar spine.\par VASCULAR: There is brisk capillary refill about the bilateral lower extremities.\par NEUROLOGIC: There are no pathologic reflexes.  Deep tendon reflexes are well maintained at 2+/4 of the bilateral lower extremities and are symmetric. L5 radiculopathy on the left\par MUSCULOSKELETAL: There is no visible muscular atrophy.  Range of motion of lumbar spine is well maintained. The patient ambulates in a non-myelopathic manner. Grossly positive tension sign and straight leg raise bilaterally, worse on the left. Quad extension, EHL, plantar flexion, and ankle eversion are well preserved. Normal secondary orthopaedic exam of bilateral hips, greater trochanteric area, knees and ankles. left foot drop with strength 3 to 4 /5.  [de-identified] : Xray of the lumbar spine taken 04/23/2021 demonstrates well maintained disc spaces, there is a subtle convexity to the right, no evidence of a spondylolisthesis. \par \par MRI of the lumbar spine taken at [facility] on  demonstrates L4-L5 left herniated disc

## 2021-04-28 ENCOUNTER — OUTPATIENT (OUTPATIENT)
Dept: OUTPATIENT SERVICES | Facility: HOSPITAL | Age: 36
LOS: 1 days | End: 2021-04-28
Payer: COMMERCIAL

## 2021-04-28 VITALS
WEIGHT: 209.44 LBS | HEIGHT: 64 IN | DIASTOLIC BLOOD PRESSURE: 78 MMHG | TEMPERATURE: 98 F | RESPIRATION RATE: 18 BRPM | HEART RATE: 76 BPM | SYSTOLIC BLOOD PRESSURE: 118 MMHG

## 2021-04-28 DIAGNOSIS — Z29.9 ENCOUNTER FOR PROPHYLACTIC MEASURES, UNSPECIFIED: ICD-10-CM

## 2021-04-28 DIAGNOSIS — U07.1 COVID-19: ICD-10-CM

## 2021-04-28 DIAGNOSIS — Z01.818 ENCOUNTER FOR OTHER PREPROCEDURAL EXAMINATION: ICD-10-CM

## 2021-04-28 DIAGNOSIS — M51.26 OTHER INTERVERTEBRAL DISC DISPLACEMENT, LUMBAR REGION: ICD-10-CM

## 2021-04-28 LAB
A1C WITH ESTIMATED AVERAGE GLUCOSE RESULT: 5.2 % — SIGNIFICANT CHANGE UP (ref 4–5.6)
ANION GAP SERPL CALC-SCNC: 12 MMOL/L — SIGNIFICANT CHANGE UP (ref 5–17)
APTT BLD: 30.1 SEC — SIGNIFICANT CHANGE UP (ref 27.5–35.5)
BASOPHILS # BLD AUTO: 0.04 K/UL — SIGNIFICANT CHANGE UP (ref 0–0.2)
BASOPHILS NFR BLD AUTO: 0.5 % — SIGNIFICANT CHANGE UP (ref 0–2)
BLD GP AB SCN SERPL QL: SIGNIFICANT CHANGE UP
BUN SERPL-MCNC: 11 MG/DL — SIGNIFICANT CHANGE UP (ref 8–20)
CALCIUM SERPL-MCNC: 9.3 MG/DL — SIGNIFICANT CHANGE UP (ref 8.6–10.2)
CHLORIDE SERPL-SCNC: 102 MMOL/L — SIGNIFICANT CHANGE UP (ref 98–107)
CO2 SERPL-SCNC: 24 MMOL/L — SIGNIFICANT CHANGE UP (ref 22–29)
CREAT SERPL-MCNC: 0.72 MG/DL — SIGNIFICANT CHANGE UP (ref 0.5–1.3)
EOSINOPHIL # BLD AUTO: 0.07 K/UL — SIGNIFICANT CHANGE UP (ref 0–0.5)
EOSINOPHIL NFR BLD AUTO: 0.8 % — SIGNIFICANT CHANGE UP (ref 0–6)
ESTIMATED AVERAGE GLUCOSE: 103 MG/DL — SIGNIFICANT CHANGE UP (ref 68–114)
GLUCOSE SERPL-MCNC: 86 MG/DL — SIGNIFICANT CHANGE UP (ref 70–99)
HCT VFR BLD CALC: 44.2 % — SIGNIFICANT CHANGE UP (ref 34.5–45)
HGB BLD-MCNC: 14.6 G/DL — SIGNIFICANT CHANGE UP (ref 11.5–15.5)
IMM GRANULOCYTES NFR BLD AUTO: 0.2 % — SIGNIFICANT CHANGE UP (ref 0–1.5)
INR BLD: 1 RATIO — SIGNIFICANT CHANGE UP (ref 0.88–1.16)
LYMPHOCYTES # BLD AUTO: 2.28 K/UL — SIGNIFICANT CHANGE UP (ref 1–3.3)
LYMPHOCYTES # BLD AUTO: 25.8 % — SIGNIFICANT CHANGE UP (ref 13–44)
MCHC RBC-ENTMCNC: 28.8 PG — SIGNIFICANT CHANGE UP (ref 27–34)
MCHC RBC-ENTMCNC: 33 GM/DL — SIGNIFICANT CHANGE UP (ref 32–36)
MCV RBC AUTO: 87.2 FL — SIGNIFICANT CHANGE UP (ref 80–100)
MONOCYTES # BLD AUTO: 0.41 K/UL — SIGNIFICANT CHANGE UP (ref 0–0.9)
MONOCYTES NFR BLD AUTO: 4.6 % — SIGNIFICANT CHANGE UP (ref 2–14)
MRSA PCR RESULT.: SIGNIFICANT CHANGE UP
NEUTROPHILS # BLD AUTO: 6.01 K/UL — SIGNIFICANT CHANGE UP (ref 1.8–7.4)
NEUTROPHILS NFR BLD AUTO: 68.1 % — SIGNIFICANT CHANGE UP (ref 43–77)
PLATELET # BLD AUTO: 279 K/UL — SIGNIFICANT CHANGE UP (ref 150–400)
POTASSIUM SERPL-MCNC: 4.5 MMOL/L — SIGNIFICANT CHANGE UP (ref 3.5–5.3)
POTASSIUM SERPL-SCNC: 4.5 MMOL/L — SIGNIFICANT CHANGE UP (ref 3.5–5.3)
PROTHROM AB SERPL-ACNC: 11.6 SEC — SIGNIFICANT CHANGE UP (ref 10.6–13.6)
RBC # BLD: 5.07 M/UL — SIGNIFICANT CHANGE UP (ref 3.8–5.2)
RBC # FLD: 12.9 % — SIGNIFICANT CHANGE UP (ref 10.3–14.5)
S AUREUS DNA NOSE QL NAA+PROBE: SIGNIFICANT CHANGE UP
SODIUM SERPL-SCNC: 138 MMOL/L — SIGNIFICANT CHANGE UP (ref 135–145)
WBC # BLD: 8.83 K/UL — SIGNIFICANT CHANGE UP (ref 3.8–10.5)
WBC # FLD AUTO: 8.83 K/UL — SIGNIFICANT CHANGE UP (ref 3.8–10.5)

## 2021-04-28 PROCEDURE — G0463: CPT

## 2021-04-28 NOTE — H&P PST ADULT - NSICDXPASTMEDICALHX_GEN_ALL_CORE_FT
PAST MEDICAL HISTORY:  C. difficile colitis     DDD (degenerative disc disease), lumbar     Gallstones     Herniated disc, cervical neck    Vertigo      PAST MEDICAL HISTORY:  C. difficile colitis 2018    DDD (degenerative disc disease), lumbar     Gallstones     Herniated disc, cervical neck    Vertigo

## 2021-04-28 NOTE — H&P PST ADULT - NSICDXFAMILYHX_GEN_ALL_CORE_FT
FAMILY HISTORY:  Mother  Still living? Yes, Estimated age: 61-70  Family history of renal cancer, Age at diagnosis: Age Unknown    Aunt  Still living? Yes, Estimated age: 61-70  FH: type 2 diabetes, Age at diagnosis: Age Unknown

## 2021-04-28 NOTE — H&P PST ADULT - ATTENDING COMMENTS
Attending statement:  I have personally seen this patient, and formed a face to face diagnostic evaluation on this patient on this date.  I have reviewed the PA, NP and or Medical/PA student and/or Resident documentation and agree with the history, physical exam and plan of care except if noted otherwise.     Based upon failed conservative management intractable sciatica in the L5 distribution on the patient's left recommending an L4-5 left discectomy. Patient is aware of incidental durotomy incomplete resolution of signs and symptoms as well as revision surgery.

## 2021-04-28 NOTE — H&P PST ADULT - ASSESSMENT
36 yr old female presents with c/o lower back pain  that radiates to left leg down to foot , pain with activity , sleeping and standing . Occasional tingling treats with gabapentin and  motrin with little improvement. Ambulates without assistance , MRI  20  l4-l5 disc bulge. Pt c/o increased discomfort had ne relief with MODESTO x2 . SCheduled for  l4-l5 DISCECTOMY.  OPIOID RISK TOOL    LUIS MANUEL EACH BOX THAT APPLIES AND ADD TOTALS AT THE END    FAMILY HISTORY OF SUBSTANCE ABUSE                 FEMALE         MALE                                                Alcohol                             [  ]1 pt          [  ]3pts                                               Illegal Durgs                     [  ]2 pts        [  ]3pts                                               Rx Drugs                           [  ]4 pts        [  ]4 pts    PERSONAL HISTORY OF SUBSTANCE ABUSE                                                                                          Alcohol                             [  ]3 pts       [  ]3 pts                                               Illegal Durgs                     [  ]4 pts        [  ]4 pts                                               Rx Drugs                           [  ]5 pts        [  ]5 pts    AGE BETWEEN 16-45 YEARS                                      [ X  OPIOID RISK TOOL    LUIS MANUEL EACH BOX THAT APPLIES AND ADD TOTALS AT THE END    FAMILY HISTORY OF SUBSTANCE ABUSE                 FEMALE         MALE                                                Alcohol                             [  ]1 pt          [  ]3pts                                               Illegal Durgs                     [  ]2 pts        [  ]3pts                                               Rx Drugs                           [  ]4 pts        [  ]4 pts    PERSONAL HISTORY OF SUBSTANCE ABUSE                                                                                          Alcohol                             [  ]3 pts       [  ]3 pts                                               Illegal Durgs                     [  ]4 pts        [  ]4 pts                                               Rx Drugs                           [  ]5 pts        [  ]5 pts    AGE BETWEEN 16-45 YEARS                                      [ X ]1 pt         [  ]1 pt    HISTORY OF PREADOLESCENT   SEXUAL ABUSE                                                             [  ]3 pts        [  ]0pts    PSYCHOLOGICAL DISEASE                     ADD, OCD, Bipolar, Schizophrenia        [  ]2 pts         [  ]2 pts                      Depression                                               [  ]1 pt           [  ]1 pt           SCORING TOTAL   (add numbers and type here)              (**1*)                                     A score of 3 or lower indicated LOW risk for future opiod abuse  A score of 4 to 7 indicated moderate risk for future opiod abuse  A score of 8 or higher indicates a high risk for opiod abuse  CAPRINI VTE 2.0 SCORE [CLOT updated 2019]    AGE RELATED RISK FACTORS                                                       MOBILITY RELATED FACTORS  [ ] Age 41-60 years                                            (1 Point)                    [ ] Bed rest                                                        (1 Point)  [ ] Age: 61-74 years                                           (2 Points)                  [ ] Plaster cast                                                   (2 Points)  [ ] Age= 75 years                                              (3 Points)                    [ ] Bed bound for more than 72 hours                 (2 Points)    DISEASE RELATED RISK FACTORS                                               GENDER SPECIFIC FACTORS  [ ] Edema in the lower extremities                       (1 Point)              [ ] Pregnancy                                                     (1 Point)  [ ] Varicose veins                                               (1 Point)                     [ ] Post-partum < 6 weeks                                   (1 Point)             X] BMI > 25 Kg/m2                                            (1 Point)                     [ ] Hormonal therapy  or oral contraception          (1 Point)                 [ ] Sepsis (in the previous month)                        (1 Point)               [ ] History of pregnancy complications                 (1 point)  [ ] Pneumonia or serious lung disease                                               [ ] Unexplained or recurrent                     (1 Point)           (in the previous month)                               (1 Point)  [ ] Abnormal pulmonary function test                     (1 Point)                 SURGERY RELATED RISK FACTORS  [ ] Acute myocardial infarction                              (1 Point)               [ ]  Section                                             (1 Point)  [ ] Congestive heart failure (in the previous month)  (1 Point)      [ ] Minor surgery                                                  (1 Point)   [ ] Inflammatory bowel disease                             (1 Point)               [ ] Arthroscopic surgery                                        (2 Points)  [ ] Central venous access                                      (2 Points)                [x ] General surgery lasting more than 45 minutes (2 points)  [ ] Malignancy- Present or previous                   (2 Points)                [ ] Elective arthroplasty                                         (5 points)    [ ] Stroke (in the previous month)                          (5 Points)                                                                                                                                                           HEMATOLOGY RELATED FACTORS                                                 TRAUMA RELATED RISK FACTORS  [ ] Prior episodes of VTE                                     (3 Points)                [ ] Fracture of the hip, pelvis, or leg                       (5 Points)  [ ] Positive family history for VTE                         (3 Points)             [ ] Acute spinal cord injury (in the previous month)  (5 Points)  [ ] Prothrombin 02003 A                                     (3 Points)               [ ] Paralysis  (less than 1 month)                             (5 Points)  [ ] Factor V Leiden                                             (3 Points)                  [ ] Multiple Trauma within 1 month                        (5 Points)  [ ] Lupus anticoagulants                                     (3 Points)                                                           [ ] Anticardiolipin antibodies                               (3 Points)                                                       [ ] High homocysteine in the blood                      (3 Points)                                             [ ] Other congenital or acquired thrombophilia      (3 Points)                                                [ ] Heparin induced thrombocytopenia                  (3 Points)                                     Total Score [    3      ]

## 2021-04-28 NOTE — H&P PST ADULT - HISTORY OF PRESENT ILLNESS
36 yr old female presents with c/o lower back pain that radiates to left leg with pain to left foot , and occasional numbness. Pt slipped on ice in 2020 and  fell in summer of 2020 . 36 yr old female presents with c/o lower back pain that radiates to left leg with pain to left foot , and occasional numbness. Pt slipped on ice in winter of 2019  and  fell in summer of 2020 . AFter  second fall pain became worse unable to sleep without pain and activity limited , pain is in lower back and radiates to left leg. Occasional numbness to left foot.  Pt had MODESTO X2 jan2021 and march 2021 no relief. Scheduled for l4-l5  discectomy with DR. Smart.  Takes Gabapentin with little relief and occasional motrin. Pain 6-9 /10. MRI done 12/30/20  l4-l5 disc bulge noted.

## 2021-04-28 NOTE — H&P PST ADULT - NSANTHOSAYNRD_GEN_A_CORE
No. DAO screening performed.  STOP BANG Legend: 0-2 = LOW Risk; 3-4 = INTERMEDIATE Risk; 5-8 = HIGH Risk

## 2021-04-28 NOTE — H&P PST ADULT - NSICDXPROBLEM_GEN_ALL_CORE_FT
PROBLEM DIAGNOSES  Problem: Need for prophylactic measure  Assessment and Plan: caprini score 3  surgical team assess need for dvt prophylaxis    Problem: 2019 novel coronavirus disease (COVID-19)  Assessment and Plan: pcr pre op     Problem: Bulging lumbar disc  Assessment and Plan: l4-l5 discectomy , left

## 2021-05-03 ENCOUNTER — APPOINTMENT (OUTPATIENT)
Dept: DISASTER EMERGENCY | Facility: CLINIC | Age: 36
End: 2021-05-03

## 2021-05-04 LAB — SARS-COV-2 N GENE NPH QL NAA+PROBE: NOT DETECTED

## 2021-05-05 ENCOUNTER — TRANSCRIPTION ENCOUNTER (OUTPATIENT)
Age: 36
End: 2021-05-05

## 2021-05-06 ENCOUNTER — APPOINTMENT (OUTPATIENT)
Dept: ORTHOPEDIC SURGERY | Facility: HOSPITAL | Age: 36
End: 2021-05-06

## 2021-05-06 ENCOUNTER — OUTPATIENT (OUTPATIENT)
Dept: INPATIENT UNIT | Facility: HOSPITAL | Age: 36
LOS: 1 days | End: 2021-05-06
Payer: COMMERCIAL

## 2021-05-06 ENCOUNTER — RESULT REVIEW (OUTPATIENT)
Age: 36
End: 2021-05-06

## 2021-05-06 VITALS
SYSTOLIC BLOOD PRESSURE: 116 MMHG | HEART RATE: 86 BPM | RESPIRATION RATE: 16 BRPM | OXYGEN SATURATION: 86 % | DIASTOLIC BLOOD PRESSURE: 70 MMHG | TEMPERATURE: 98 F

## 2021-05-06 VITALS
SYSTOLIC BLOOD PRESSURE: 95 MMHG | HEART RATE: 81 BPM | RESPIRATION RATE: 16 BRPM | WEIGHT: 197.98 LBS | OXYGEN SATURATION: 100 % | DIASTOLIC BLOOD PRESSURE: 55 MMHG | HEIGHT: 64 IN | TEMPERATURE: 98 F

## 2021-05-06 DIAGNOSIS — M51.26 OTHER INTERVERTEBRAL DISC DISPLACEMENT, LUMBAR REGION: ICD-10-CM

## 2021-05-06 DIAGNOSIS — M54.16 RADICULOPATHY, LUMBAR REGION: ICD-10-CM

## 2021-05-06 PROCEDURE — 36415 COLL VENOUS BLD VENIPUNCTURE: CPT

## 2021-05-06 PROCEDURE — C1889: CPT

## 2021-05-06 PROCEDURE — 63030 LAMOT DCMPRN NRV RT 1 LMBR: CPT | Mod: AS,LT

## 2021-05-06 PROCEDURE — 63030 LAMOT DCMPRN NRV RT 1 LMBR: CPT | Mod: LT

## 2021-05-06 PROCEDURE — 76000 FLUOROSCOPY <1 HR PHYS/QHP: CPT

## 2021-05-06 PROCEDURE — C1763: CPT

## 2021-05-06 RX ORDER — OXYCODONE HYDROCHLORIDE 5 MG/1
1 TABLET ORAL
Qty: 28 | Refills: 0
Start: 2021-05-06 | End: 2021-05-12

## 2021-05-06 RX ORDER — CEPHALEXIN 500 MG
1 CAPSULE ORAL
Qty: 4 | Refills: 0
Start: 2021-05-06 | End: 2021-05-06

## 2021-05-06 RX ORDER — IBUPROFEN 200 MG
1 TABLET ORAL
Qty: 0 | Refills: 0 | DISCHARGE

## 2021-05-06 RX ORDER — METHOCARBAMOL 500 MG/1
2 TABLET, FILM COATED ORAL
Qty: 30 | Refills: 0
Start: 2021-05-06 | End: 2021-05-10

## 2021-05-06 RX ORDER — SODIUM CHLORIDE 9 MG/ML
1000 INJECTION, SOLUTION INTRAVENOUS
Refills: 0 | Status: DISCONTINUED | OUTPATIENT
Start: 2021-05-06 | End: 2021-05-06

## 2021-05-06 RX ORDER — CEFAZOLIN SODIUM 1 G
2000 VIAL (EA) INJECTION ONCE
Refills: 0 | Status: DISCONTINUED | OUTPATIENT
Start: 2021-05-06 | End: 2021-05-06

## 2021-05-06 RX ORDER — SODIUM CHLORIDE 9 MG/ML
3 INJECTION INTRAMUSCULAR; INTRAVENOUS; SUBCUTANEOUS ONCE
Refills: 0 | Status: DISCONTINUED | OUTPATIENT
Start: 2021-05-06 | End: 2021-05-06

## 2021-05-06 RX ORDER — FENTANYL CITRATE 50 UG/ML
50 INJECTION INTRAVENOUS
Refills: 0 | Status: DISCONTINUED | OUTPATIENT
Start: 2021-05-06 | End: 2021-05-06

## 2021-05-06 RX ORDER — ONDANSETRON 8 MG/1
4 TABLET, FILM COATED ORAL ONCE
Refills: 0 | Status: DISCONTINUED | OUTPATIENT
Start: 2021-05-06 | End: 2021-05-06

## 2021-05-06 RX ORDER — GABAPENTIN 400 MG/1
0 CAPSULE ORAL
Qty: 0 | Refills: 0 | DISCHARGE

## 2021-05-06 RX ADMIN — FENTANYL CITRATE 50 MICROGRAM(S): 50 INJECTION INTRAVENOUS at 10:01

## 2021-05-06 RX ADMIN — FENTANYL CITRATE 50 MICROGRAM(S): 50 INJECTION INTRAVENOUS at 10:03

## 2021-05-06 NOTE — BRIEF OPERATIVE NOTE - NSICDXBRIEFPOSTOP_GEN_ALL_CORE_FT
POST-OP DIAGNOSIS:  Lumbar herniated disc 06-May-2021 06:51:47  Wilfrid Smart  
POST-OP DIAGNOSIS:  Lumbar herniated disc 06-May-2021 06:51:47  Wilfrid Smart

## 2021-05-06 NOTE — BRIEF OPERATIVE NOTE - NSICDXBRIEFPROCEDURE_GEN_ALL_CORE_FT
PROCEDURES:  Lumbar discectomy 06-May-2021 06:51:18  Wilfrid Smart  
PROCEDURES:  Lumbar discectomy 06-May-2021 06:51:18  Wilfrid Smart

## 2021-05-06 NOTE — ASU DISCHARGE PLAN (ADULT/PEDIATRIC) - CARE PROVIDER_API CALL
Wilfrid Smart (DO)  Orthopaedic Surgery  [address] [place] [address], [address]  [place] [address]  Phone: [phone]  Fax: [phone]  Follow Up Time:

## 2021-05-06 NOTE — ASU DISCHARGE PLAN (ADULT/PEDIATRIC) - COMMENTS
1 week follow up is already scheduled 1 week follow up is already scheduled.  tylenol or ibuprofen as directed prn mild to moderate pain, oxycodone 5 mg every 6 hours prn severe pain.  methocarbamol 750 mg tid prn spasm.  keflex 500 mg 4 times a day x 1 day to prevent skin infection.

## 2021-05-06 NOTE — BRIEF OPERATIVE NOTE - OPERATION/FINDINGS
large HNP
Lumbar spine was cleansed with Betadine scrub brush personally by me and using fluoroscopic imaging I confirmed and marked L4 -L5 interspinous area. DuraPrep was then utilized for definitive prepped by the R.N. I assisted with placement of blue drapes, Ioban. I participated in operative time out.  We utilized 20 mL of 1% lidocaine as a local anesthetic. Skin incision was then made and dissection with Bovie cautery was carried out  so L4, L5 lamina were clearly visualized. Kocher were placed over the caudal aspect of L4 and fluoroscopic imaging again confirmed appropriate interpositional space between the L4, L5.  I assisted with visualization of the operative area by utilizing sequential retraction in the form of cerebellar, then discectomy retractor, by using persistent suction, intermittent irrigation, and providing hemostasis with FloSeal, Gelfoam and bovie cautery when appropriate.  After herniated disc L4-L5 left  was removed, copious irrigation was utilized, hemostasis was accomplished. After administration of 1% Marcaine 30 mL, I personally performed closure with 0 Vicryl to the deep fascia, 2-0 Vicryl for superficial fascia, Monocryl for skin, Dermabond a longitudinal tear he strips. Dry sterile mepelex then tegaderm dressing placed.

## 2021-05-14 ENCOUNTER — APPOINTMENT (OUTPATIENT)
Dept: ORTHOPEDIC SURGERY | Facility: CLINIC | Age: 36
End: 2021-05-14
Payer: COMMERCIAL

## 2021-05-14 PROBLEM — A04.72 ENTEROCOLITIS DUE TO CLOSTRIDIUM DIFFICILE, NOT SPECIFIED AS RECURRENT: Chronic | Status: ACTIVE | Noted: 2018-02-27

## 2021-05-14 PROBLEM — M51.36 OTHER INTERVERTEBRAL DISC DEGENERATION, LUMBAR REGION: Chronic | Status: ACTIVE | Noted: 2021-04-28

## 2021-05-14 PROCEDURE — 99024 POSTOP FOLLOW-UP VISIT: CPT

## 2021-05-14 PROCEDURE — 72100 X-RAY EXAM L-S SPINE 2/3 VWS: CPT

## 2021-05-14 NOTE — HISTORY OF PRESENT ILLNESS
[de-identified] : Patient is approximately 10 day status post discectomy [de-identified] : patient is approximately 8 days status post lumbar discectomy elbow she states is complaint is completely resolved isn't very minimal radiculopathy no motor deficits her primary complaint is more mechanically orientated postoperative back pain. [de-identified] : Incisions cons of ChloraPrep new dressing is applied patient has no motor deficits motor strength is 5 out of 5 is very minimal thumb radiculopathy distally. [de-identified] : x-rays taken on today's date show no acute spondylolisthesis patient is status post lumbar discectomy [de-identified] : Status post lumbar discectomy [de-identified] : Gradual improvement activity continue walking patient in followup in approximately 3 weeks for repeat clinical assessment our goal is to have full return to activity by 6 weeks

## 2021-06-01 ENCOUNTER — APPOINTMENT (OUTPATIENT)
Dept: ORTHOPEDIC SURGERY | Facility: CLINIC | Age: 36
End: 2021-06-01
Payer: COMMERCIAL

## 2021-06-01 PROCEDURE — 99024 POSTOP FOLLOW-UP VISIT: CPT

## 2021-06-01 NOTE — ADDENDUM
[FreeTextEntry1] : Documented by Igor Rojo acting as a scribe for Ellie Tang on 12/03/2020. All medical record entries made by the Scribe were at my, Ellie Tang, direction and personally dictated by me on 12/03/2020 . I have reviewed the chart and agree that the record accurately reflects my personal performance of the history, physical exam, assessment and plan. I have also personally directed, reviewed, and agreed with the chart.

## 2021-06-01 NOTE — HISTORY OF PRESENT ILLNESS
[Clean/Dry/Intact] : clean, dry and intact [Healed] : healed [Neuro Intact] : an unremarkable neurological exam [Vascular Intact] : ~T peripheral vascular exam normal [Doing Well] : is doing well [Excellent Pain Control] : has excellent pain control [No Sign of Infection] : is showing no signs of infection [Chills] : no chills [Fever] : no fever [Erythema] : not erythematous [Discharge] : absent of discharge [Swelling] : not swollen [Dehiscence] : not dehisced [de-identified] : Patient is approximately 1 month status post discectomy at L4-L5.  [de-identified] : 36 year old F S/p L4-L5 diskectomy. She states S/p her LE pain has resolved but she does have some residual numbness which is expected at this time. She also states her back is tight which is expected. Ambulating daily, still guarded in regard to bending.  [de-identified] : constitutional- No acute distress\par neurologic- LLE shin to foot numbness. \par skin- incision dry clean and intact. The incision was well healed.\par musculoskeletal - motor strength is 5/5.  [de-identified] : Xray of the lumbar spine taken 06/01/2021 demonstrates S/p L4-L5 diskectomy no acute spondylolisthesis [de-identified] : Status post lumbar discectomy [de-identified] : We discussed her residual numbness and that at this time the issue may resolve but it is possible that this numbness is permanent, this was discussed clearly with the patient. I advised the patient to reframe from repetitive bending, lifting, or twisting as well as to reframe from lifting more than 10 - 15 pounds. The patient will follow up in 2 months for a repeat clinical assessment.

## 2021-08-06 ENCOUNTER — APPOINTMENT (OUTPATIENT)
Dept: ORTHOPEDIC SURGERY | Facility: CLINIC | Age: 36
End: 2021-08-06

## 2021-08-20 NOTE — ASU DISCHARGE PLAN (ADULT/PEDIATRIC) - HISTORY OF COVID-19 VACCINATION
[Pelvic pain] : pelvic pain [Genital Rash/Irritation] : genital rash/irritation [Negative] : Heme/Lymph No

## 2021-09-28 ENCOUNTER — APPOINTMENT (OUTPATIENT)
Dept: ORTHOPEDIC SURGERY | Facility: CLINIC | Age: 36
End: 2021-09-28
Payer: COMMERCIAL

## 2021-09-28 PROCEDURE — 99213 OFFICE O/P EST LOW 20 MIN: CPT

## 2021-09-28 NOTE — HISTORY OF PRESENT ILLNESS
[Clean/Dry/Intact] : clean, dry and intact [Healed] : healed [Neuro Intact] : an unremarkable neurological exam [Vascular Intact] : ~T peripheral vascular exam normal [Doing Well] : is doing well [Excellent Pain Control] : has excellent pain control [No Sign of Infection] : is showing no signs of infection [Chills] : no chills [Fever] : no fever [Erythema] : not erythematous [Discharge] : absent of discharge [Swelling] : not swollen [Dehiscence] : not dehisced [de-identified] : status post discectomy at L4-L5 on the left DOS: 05- [de-identified] : 36 year old F S/p L4-L5 diskectomy. Patient states her left leg pain is completely resolved. She still notes some left lateral calf pins and needle sensation. This is basically the same as before surgery. Otherwise she is very happy, as she states pain was the worst pre surgical symptom and it is now gone. Patient does note some back pain a few days ago when her son jumped on her, which was resolved with one muscle relaxer. [de-identified] : constitutional- No acute distress\par neurologic- LLE shin to foot numbness. \par skin- incision dry clean and intact. The incision was well healed.\par musculoskeletal - motor strength is 5/5.  [de-identified] : Xray of the lumbar spine taken 06/01/2021 demonstrates S/p L4-L5 diskectomy no acute spondylolisthesis\par No new x-rays taken today. [de-identified] : Status post lumbar discectomy [de-identified] : Tatum presents 4 months s/p discetcomy L4-L5, doing very well. She can revisit at the 6-8 months postop liban. If she is doing very well, we can delay that to a year. If new symptoms develop, patient is advised to follow up sooner.

## 2021-11-19 ENCOUNTER — APPOINTMENT (OUTPATIENT)
Dept: ORTHOPEDIC SURGERY | Facility: CLINIC | Age: 36
End: 2021-11-19
Payer: COMMERCIAL

## 2021-11-19 VITALS
DIASTOLIC BLOOD PRESSURE: 72 MMHG | WEIGHT: 198 LBS | BODY MASS INDEX: 33.8 KG/M2 | SYSTOLIC BLOOD PRESSURE: 122 MMHG | HEIGHT: 64 IN | HEART RATE: 77 BPM

## 2021-11-19 DIAGNOSIS — Z98.890 OTHER SPECIFIED POSTPROCEDURAL STATES: ICD-10-CM

## 2021-11-19 DIAGNOSIS — Z87.39 PERSONAL HISTORY OF OTHER DISEASES OF THE MUSCULOSKELETAL SYSTEM AND CONNECTIVE TISSUE: ICD-10-CM

## 2021-11-19 PROCEDURE — 99213 OFFICE O/P EST LOW 20 MIN: CPT

## 2021-11-19 NOTE — PHYSICAL EXAM
[Obese] : obese [Poor Appearance] : well-appearing [Acute Distress] : not in acute distress [de-identified] : CONSTITUTIONAL: The patient is a very pleasant individual who is well-nourished and who appears stated age.\par PSYCHIATRIC: The patient is alert and oriented X 3 and in no apparent distress, and participates with orthopedic evaluation well.\par HEAD: Atraumatic and is nonsyndromic in appearance.\par EENT: No visible thyromegaly, EOMI.\par RESPIRATORY: Respiratory rate is regular, not dyspneic on examination.\par LYMPHATICS: There is no inguinal lymphadenopathy\par INTEGUMENTARY: Skin is clean, dry, and intact about the bilateral lower extremities and lumbar spine.\par VASCULAR: There is brisk capillary refill about the bilateral lower extremities.\par NEUROLOGIC: There are no pathologic reflexes. There is no decrease in sensation of the bilateral lower extremities on Wartenberg pinwheel examination. Deep tendon reflexes are well maintained at 2+/4 of the bilateral lower extremities and are symmetric.\par MUSCULOSKELETAL: There is no visible muscular atrophy. Manual motor strength is well maintained in the bilateral lower extremities. Range of motion of lumbar spine is well maintained. The patient ambulates in a non-myelopathic manner. Negative tension sign and straight leg raise bilaterally. Quad extension, ankle dorsiflexion, EHL, plantar flexion, and ankle eversion are well preserved. Normal secondary orthopaedic exam of bilateral hips, greater trochanteric area, knees and ankles. No pain with internal or external rotation of the bilateral hips.  [de-identified] : Xray of the lumbar spine taken 06/01/2021 demonstrates S/p L4-L5 diskectomy no acute spondylolisthesis.

## 2021-11-19 NOTE — DISCUSSION/SUMMARY
[de-identified] : We talked about the nature of the condition and treatment options. Anticipatory guidance regarding S/p diskectomy was given. I advised the patient to reframe from repetitive bending, lifting, or twisting as well as to reframe from lifting more than 10 - 15 pounds. Patient to follow up on a PRN basis. \par \par Prior to appointment and during encounter with patient extensive medical records were reviewed including but not limited to, hospital records, out patient records, imaging results, and lab data. During this appointment the patient was examined, diagnoses were discussed and explained in a face to face manner. In addition extensive time was spent reviewing aforementioned diagnostic studies. Counseling including abnormal image results, differential diagnoses, treatment options, risk and benefits, lifestyle changes, current condition, and current medications was performed. Patient's comments, questions, and concerns were address and patient verbalized understanding. Based on this patient's presentation at our office, which is an orthopedic spine surgeon's office, this patient inherently / intrinsically has a risk, however minute, of developing  issues such as Cauda equina syndrome, bowel and bladder changes, or progression of motor or neurological deficits such as paralysis which may be permanent.

## 2021-11-19 NOTE — ADDENDUM
[FreeTextEntry1] : Documented by Igor Rojo acting as a scribe for Dr. Wilfrid Smart on 11/19/2021. All medical record entries made by the Scribe were at my, Dr. Wilfrid Smart, direction and personally dictated by me on 11/19/2021 . I have reviewed the chart and agree that the record accurately reflects my personal performance of the history, physical exam, assessment and plan. I have also personally directed, reviewed, and agreed with the chart.

## 2021-11-19 NOTE — HISTORY OF PRESENT ILLNESS
[de-identified] : 36 year old F Presents for follow up evaluation 6 months S/p lumbar diskectomy, states she is essentially asymptomatic. There is very minimal residual radiculopathy that is improving. Very pleased with surgical outcome.  [Ataxia] : no ataxia [Incontinence] : no incontinence [Loss of Dexterity] : good dexterity [Urinary Ret.] : no urinary retention

## 2021-11-20 ENCOUNTER — EMERGENCY (EMERGENCY)
Facility: HOSPITAL | Age: 36
LOS: 1 days | Discharge: HOME | End: 2021-11-20
Attending: EMERGENCY MEDICINE
Payer: COMMERCIAL

## 2021-11-20 VITALS
HEART RATE: 88 BPM | RESPIRATION RATE: 18 BRPM | SYSTOLIC BLOOD PRESSURE: 124 MMHG | OXYGEN SATURATION: 99 % | DIASTOLIC BLOOD PRESSURE: 88 MMHG | TEMPERATURE: 98 F

## 2021-11-20 VITALS
TEMPERATURE: 99 F | WEIGHT: 222.01 LBS | HEART RATE: 92 BPM | DIASTOLIC BLOOD PRESSURE: 88 MMHG | RESPIRATION RATE: 20 BRPM | HEIGHT: 64 IN | OXYGEN SATURATION: 97 % | SYSTOLIC BLOOD PRESSURE: 141 MMHG

## 2021-11-20 LAB
ALBUMIN SERPL ELPH-MCNC: 4.3 G/DL — SIGNIFICANT CHANGE UP (ref 3.3–5.2)
ALP SERPL-CCNC: 71 U/L — SIGNIFICANT CHANGE UP (ref 40–120)
ALT FLD-CCNC: 29 U/L — SIGNIFICANT CHANGE UP
ANION GAP SERPL CALC-SCNC: 13 MMOL/L — SIGNIFICANT CHANGE UP (ref 5–17)
APTT BLD: 31 SEC — SIGNIFICANT CHANGE UP (ref 27.5–35.5)
AST SERPL-CCNC: 21 U/L — SIGNIFICANT CHANGE UP
BASOPHILS # BLD AUTO: 0.03 K/UL — SIGNIFICANT CHANGE UP (ref 0–0.2)
BASOPHILS NFR BLD AUTO: 0.5 % — SIGNIFICANT CHANGE UP (ref 0–2)
BILIRUB SERPL-MCNC: 0.3 MG/DL — LOW (ref 0.4–2)
BUN SERPL-MCNC: 14.6 MG/DL — SIGNIFICANT CHANGE UP (ref 8–20)
CALCIUM SERPL-MCNC: 8.9 MG/DL — SIGNIFICANT CHANGE UP (ref 8.6–10.2)
CHLORIDE SERPL-SCNC: 101 MMOL/L — SIGNIFICANT CHANGE UP (ref 98–107)
CO2 SERPL-SCNC: 23 MMOL/L — SIGNIFICANT CHANGE UP (ref 22–29)
CREAT SERPL-MCNC: 0.71 MG/DL — SIGNIFICANT CHANGE UP (ref 0.5–1.3)
EOSINOPHIL # BLD AUTO: 0.07 K/UL — SIGNIFICANT CHANGE UP (ref 0–0.5)
EOSINOPHIL NFR BLD AUTO: 1.1 % — SIGNIFICANT CHANGE UP (ref 0–6)
FLUAV AG NPH QL: SIGNIFICANT CHANGE UP
FLUBV AG NPH QL: SIGNIFICANT CHANGE UP
GLUCOSE SERPL-MCNC: 106 MG/DL — HIGH (ref 70–99)
HCG SERPL-ACNC: <4 MIU/ML — SIGNIFICANT CHANGE UP
HCT VFR BLD CALC: 42.7 % — SIGNIFICANT CHANGE UP (ref 34.5–45)
HGB BLD-MCNC: 14.3 G/DL — SIGNIFICANT CHANGE UP (ref 11.5–15.5)
IMM GRANULOCYTES NFR BLD AUTO: 0.3 % — SIGNIFICANT CHANGE UP (ref 0–1.5)
INR BLD: 1.01 RATIO — SIGNIFICANT CHANGE UP (ref 0.88–1.16)
LYMPHOCYTES # BLD AUTO: 1.99 K/UL — SIGNIFICANT CHANGE UP (ref 1–3.3)
LYMPHOCYTES # BLD AUTO: 30.9 % — SIGNIFICANT CHANGE UP (ref 13–44)
MCHC RBC-ENTMCNC: 29.1 PG — SIGNIFICANT CHANGE UP (ref 27–34)
MCHC RBC-ENTMCNC: 33.5 GM/DL — SIGNIFICANT CHANGE UP (ref 32–36)
MCV RBC AUTO: 86.8 FL — SIGNIFICANT CHANGE UP (ref 80–100)
MONOCYTES # BLD AUTO: 0.47 K/UL — SIGNIFICANT CHANGE UP (ref 0–0.9)
MONOCYTES NFR BLD AUTO: 7.3 % — SIGNIFICANT CHANGE UP (ref 2–14)
NEUTROPHILS # BLD AUTO: 3.87 K/UL — SIGNIFICANT CHANGE UP (ref 1.8–7.4)
NEUTROPHILS NFR BLD AUTO: 59.9 % — SIGNIFICANT CHANGE UP (ref 43–77)
PLATELET # BLD AUTO: 238 K/UL — SIGNIFICANT CHANGE UP (ref 150–400)
POTASSIUM SERPL-MCNC: 4.4 MMOL/L — SIGNIFICANT CHANGE UP (ref 3.5–5.3)
POTASSIUM SERPL-SCNC: 4.4 MMOL/L — SIGNIFICANT CHANGE UP (ref 3.5–5.3)
PROT SERPL-MCNC: 7.3 G/DL — SIGNIFICANT CHANGE UP (ref 6.6–8.7)
PROTHROM AB SERPL-ACNC: 11.7 SEC — SIGNIFICANT CHANGE UP (ref 10.6–13.6)
RBC # BLD: 4.92 M/UL — SIGNIFICANT CHANGE UP (ref 3.8–5.2)
RBC # FLD: 12.8 % — SIGNIFICANT CHANGE UP (ref 10.3–14.5)
RSV RNA NPH QL NAA+NON-PROBE: SIGNIFICANT CHANGE UP
SARS-COV-2 RNA SPEC QL NAA+PROBE: SIGNIFICANT CHANGE UP
SODIUM SERPL-SCNC: 137 MMOL/L — SIGNIFICANT CHANGE UP (ref 135–145)
TROPONIN T SERPL-MCNC: <0.01 NG/ML — SIGNIFICANT CHANGE UP (ref 0–0.06)
WBC # BLD: 6.45 K/UL — SIGNIFICANT CHANGE UP (ref 3.8–10.5)
WBC # FLD AUTO: 6.45 K/UL — SIGNIFICANT CHANGE UP (ref 3.8–10.5)

## 2021-11-20 PROCEDURE — 36415 COLL VENOUS BLD VENIPUNCTURE: CPT

## 2021-11-20 PROCEDURE — 85025 COMPLETE CBC W/AUTO DIFF WBC: CPT

## 2021-11-20 PROCEDURE — 70496 CT ANGIOGRAPHY HEAD: CPT | Mod: 26,MA

## 2021-11-20 PROCEDURE — 82962 GLUCOSE BLOOD TEST: CPT

## 2021-11-20 PROCEDURE — 70450 CT HEAD/BRAIN W/O DYE: CPT | Mod: MA

## 2021-11-20 PROCEDURE — 84702 CHORIONIC GONADOTROPIN TEST: CPT

## 2021-11-20 PROCEDURE — 85610 PROTHROMBIN TIME: CPT

## 2021-11-20 PROCEDURE — G1004: CPT

## 2021-11-20 PROCEDURE — 85730 THROMBOPLASTIN TIME PARTIAL: CPT

## 2021-11-20 PROCEDURE — 70498 CT ANGIOGRAPHY NECK: CPT | Mod: MA

## 2021-11-20 PROCEDURE — 93005 ELECTROCARDIOGRAM TRACING: CPT

## 2021-11-20 PROCEDURE — 99285 EMERGENCY DEPT VISIT HI MDM: CPT | Mod: 25

## 2021-11-20 PROCEDURE — 84484 ASSAY OF TROPONIN QUANT: CPT

## 2021-11-20 PROCEDURE — 80053 COMPREHEN METABOLIC PANEL: CPT

## 2021-11-20 PROCEDURE — 99223 1ST HOSP IP/OBS HIGH 75: CPT

## 2021-11-20 PROCEDURE — 99285 EMERGENCY DEPT VISIT HI MDM: CPT

## 2021-11-20 PROCEDURE — 70450 CT HEAD/BRAIN W/O DYE: CPT | Mod: 26,59,MA

## 2021-11-20 PROCEDURE — 70498 CT ANGIOGRAPHY NECK: CPT | Mod: 26,MA

## 2021-11-20 PROCEDURE — 0042T: CPT

## 2021-11-20 PROCEDURE — 70551 MRI BRAIN STEM W/O DYE: CPT | Mod: 26,ME

## 2021-11-20 PROCEDURE — 70496 CT ANGIOGRAPHY HEAD: CPT | Mod: MA

## 2021-11-20 PROCEDURE — 87637 SARSCOV2&INF A&B&RSV AMP PRB: CPT

## 2021-11-20 PROCEDURE — 70551 MRI BRAIN STEM W/O DYE: CPT | Mod: ME

## 2021-11-20 PROCEDURE — 93010 ELECTROCARDIOGRAM REPORT: CPT

## 2021-11-20 RX ORDER — ACETAMINOPHEN 500 MG
975 TABLET ORAL ONCE
Refills: 0 | Status: COMPLETED | OUTPATIENT
Start: 2021-11-20 | End: 2021-11-20

## 2021-11-20 RX ADMIN — Medication 975 MILLIGRAM(S): at 15:00

## 2021-11-20 NOTE — ED PROVIDER NOTE - OBJECTIVE STATEMENT
35 yo female history of vertigo presents with change to her left eye peripheral vision that began suddenly at 0830 this am. Patient states that she was walking from her bathroom when she suddenly experienced a changed in her left sided peripheral vision. She states that her left sided vision feels "foggy" and that she is occasionally seeing double. She states that she is still able to see out of her right eye and notes no other changes to the medial aspect of her left eye. She denies headache, dizziness, weakness/numbness/tingling in any of her other extremities. Patient states that her symptoms are not reminiscent of her vertigo. Denies fever/chills, cough, sore throat, SOB, abd pain, n/v/d, back pain, urinary complaints.

## 2021-11-20 NOTE — ED PROVIDER NOTE - CLINICAL SUMMARY MEDICAL DECISION MAKING FREE TEXT BOX
37 yo female presents w left eye peripheral vision loss beginning at 0835 this am. Code stroke called. Telestroke will be contacted. Monitor and reassess.

## 2021-11-20 NOTE — ED PROVIDER NOTE - NSICDXPASTMEDICALHX_GEN_ALL_CORE_FT
PAST MEDICAL HISTORY:  C. difficile colitis 2018    DDD (degenerative disc disease), lumbar     Gallstones     Herniated disc, cervical neck    Vertigo

## 2021-11-20 NOTE — ED PROVIDER NOTE - PHYSICAL EXAMINATION
Const: Awake, alert and oriented. In no acute distress. Well appearing.  HEENT: NC/AT. Moist mucous membranes.  Eyes: No scleral icterus. EOMI.  Neck:. Soft and supple. Full ROM without pain.  Cardiac: Regular rate and regular rhythm. +S1/S2. Peripheral pulses 2+ and symmetric. No LE edema.  Resp: Speaking in full sentences. No evidence of respiratory distress. No wheezes, rales or rhonchi.  Abd: Soft, non-tender, non-distended. Normal bowel sounds in all 4 quadrants. No guarding or rebound.  Back: Spine midline and non-tender. No CVAT.  Skin: No rashes, abrasions or lacerations.  Lymph: No cervical lymphadenopathy.  Neuro: Awake, alert & oriented x 3. Moves all extremities symmetrically. Left eye peripheral vision loss. Const: Awake, alert and oriented. In no acute distress. Well appearing.  HEENT: NC/AT. Moist mucous membranes.  Eyes: No scleral icterus. EOMI. Right eye vision 20/10. Left eye vision 20/25.   Neck:. Soft and supple. Full ROM without pain.  Cardiac: Regular rate and regular rhythm. +S1/S2. Peripheral pulses 2+ and symmetric. No LE edema.  Resp: Speaking in full sentences. No evidence of respiratory distress. No wheezes, rales or rhonchi.  Abd: Soft, non-tender, non-distended. Normal bowel sounds in all 4 quadrants. No guarding or rebound.  Back: Spine midline and non-tender. No CVAT.  Skin: No rashes, abrasions or lacerations.  Lymph: No cervical lymphadenopathy.  Neuro: Awake, alert & oriented x 3. Moves all extremities symmetrically. Left eye peripheral vision loss.

## 2021-11-20 NOTE — CONSULT NOTE ADULT - ASSESSMENT
The patient is a 36y Female who is followed by neurology because of vision changes    Vision changes  Left visual field disturbance followed by headache with nausea and photophobia  suspect migraine with aura  would check MRI brain and if no acute stroke she can likely be discharged home  She did not want medication for headache at this time.    discussed with Dr Espinosa    Thank you for allowing me to participate in the care of your patient    Eliot Crowder MD, PhD   [phone]

## 2021-11-20 NOTE — ED ADULT NURSE REASSESSMENT NOTE - NS ED NURSE REASSESS COMMENT FT1
assumed care of patient 1100. pt alert and oriented x4. RR even unlabored. pt NIH1. pt educated on plan of care, pt able to successfully teach back plan of care to RN, RN will continue to reeducate pt during hospital stay.

## 2021-11-20 NOTE — ED PROVIDER NOTE - NSFOLLOWUPINSTRUCTIONS_ED_ALL_ED_FT
Visual Disturbances       A visual disturbance is any problem that interferes with your normal vision. This can affect one eye or both eyes. Some types of visual disturbances come and go without treatment and do not cause a permanent problem. Other visual disturbances may be a sign of a medical emergency.  Visual disturbances include:  •Blurred vision.      •Being unable to see certain colors.      •Being sensitive to light.      •Double vision.      •Partial vision loss (visual field deficit).      •Being unaware of objects on one side of the body (visual spatial inattention).      •Rhythmic eye movements that you cannot control (nystagmus).      •Short-term or long-term blindness.    •Seeing:  •Floating spots or lines (floaters).      •Flashing or shimmering lights.      •Zigzagging lines or stars.      •The floor as tilted (visual midline shift).      •Things that are not really there (hallucinations).      Causes of visual disturbances include:  •Eye infection.      •The thin membrane at the back of the eye  from the eyeball (retinal detachment).      •High blood pressure.      •Migraine.      •Glaucoma.      •Ischemic stroke.      •Cerebral aneurysm.      It is important to get your eyes checked by a health care provider or eye specialist (ophthalmologist or optometrist) as soon as possible to determine the cause of your visual disturbance.      Follow these instructions at home:    •Take over-the-counter and prescription medicines only as told by your health care provider.      • Do not use any products that contain nicotine or tobacco, such as cigarettes and e-cigarettes. If you need help quitting, ask your health care provider.    •To lower your risk of the problems that can lead to visual disturbances:  •Eat a balanced diet that includes fruits and vegetables, whole grains, lean meat, and low-fat dairy.      •Maintain a healthy weight. Work with your health care provider to lose weight if you need to.      •Exercise regularly. Ask your health care provider what activities are safe for you.        • Do not drive if you have trouble seeing. Ask your health care provider for guidance about when it is and is not safe for you to drive.      •Keep all follow-up visits as told by your health care provider. This is important.        Contact a health care provider if:    •Your visual disturbance changes or becomes worse.        Get help right away if you:     •Have new visual disturbances.      •Suddenly see flashing lights or floaters.      •Suddenly have a dark area in your field of vision, especially in the lower part. This can lead to a loss of central vision.      •Lose vision in one or both eyes.    •Have any symptoms of a stroke. "BE FAST" is an easy way to remember the main warning signs of a stroke:  •B - Balance. Signs are dizziness, sudden trouble walking, or loss of balance.      •E - Eyes. Signs are trouble seeing or a sudden change in vision.      •F - Face. Signs are sudden weakness or numbness of the face, or the face or eyelid drooping on one side.      •A - Arms. Signs are weakness or numbness in an arm. This happens suddenly and usually on one side of the body.      •S - Speech. Signs are sudden trouble speaking, slurred speech, or trouble understanding what people say.      •T - Time. Time to call emergency services. Write down what time symptoms started.      •Have other signs of a stroke, such as:  •A sudden, severe headache with no known cause.      •Nausea or vomiting.      •Seizure.        These symptoms may represent a serious problem that is an emergency. Do not wait to see if the symptoms will go away. Get medical help right away. Call your local emergency services (911 in the [place] Do not drive yourself to the hospital.       Summary    •A visual disturbance is any problem that interferes with your normal vision.      •Some visual disturbances may be a sign of a medical emergency.      •It is important to get your eyes checked by a health care provider or eye specialist to determine what kind of visual disturbance you have.      This information is not intended to replace advice given to you by your health care provider. Make sure you discuss any questions you have with your health care provider.

## 2021-11-20 NOTE — CONSULT NOTE ADULT - SUBJECTIVE AND OBJECTIVE BOX
[facility]                                     Neurology at [place]                                 [address]                                  [address]                                        [place], [place], [address]                                             [phone]    CC: left vision field loss  HPI:  The patient is a 36y Female who presented with acute left visual field changes this morning. She had blurry then loss, of vision in left visual field, now a bit blurry again.  She also saw some spots.  She has now develeoped headache with photophobia and nausea.  She denies migrain history.  She has no other neuro symptoms.  She has vertyigo in the past and had normal MRI brain at that time per her report.  Neurology is asked to assess.        PAST MEDICAL & SURGICAL HISTORY:  Gallstones    Vertigo    Herniated disc, cervical  neck    C. difficile colitis  2018    DDD (degenerative disc disease), lumbar    No significant past surgical history        MEDICATIONS  (STANDING):    MEDICATIONS  (PRN):      Allergies    No Known Allergies    Intolerances        SOCIAL HISTORY:  no tob,   no alcohol   no drugs    FAMILY HISTORY:  Family history of renal cancer (Mother)    FH: type 2 diabetes (Aunt)      ROS: 14 point ROS negative other than what is present in HPI or below    Vital Signs Last 24 Hrs  T(C): 37 (20 Nov 2021 09:23), Max: 37 (20 Nov 2021 09:23)  T(F): 98.6 (20 Nov 2021 09:23), Max: 98.6 (20 Nov 2021 09:23)  HR: 92 (20 Nov 2021 09:23) (92 - 92)  BP: 141/88 (20 Nov 2021 09:23) (141/88 - 141/88)  BP(mean): --  RR: 20 (20 Nov 2021 09:23) (20 - 20)  SpO2: 97% (20 Nov 2021 09:23) (97% - 97%)      General: NAD    Detailed Neurologic Exam:    Mental status: The patient is awake and alert and has normal attention span.  The patient is fully oriented in 3 spheres. The patient is oriented to current events. The patient is able to name objects, follow commands, repeat sentences.    Cranial nerves: Pupils equal and react symmetrically to light. There is no visual field deficit to confrontation, but feels vision is blurry on left. Extraocular motion is full with no nystagmus. There is no ptosis. Facial sensation is intact. Facial musculature is symmetric. Palate elevates symmetrically. Tongue is midline.    Motor: There is normal bulk and tone.  There is no tremor.  Strength is 5/5 in the right arm and leg.   Strength is 5/5 in the left arm and leg.    Sensation: Intact to light touch and pin in 4 extremities    Reflexes: 2+ throughout and plantar responses are flexor.    Cerebellar: There is no dysmetria on finger to nose testing.    Gait : deferred    LABS:                         14.3   6.45  )-----------( 238      ( 20 Nov 2021 09:34 )             42.7       11-20    137  |  101  |  14.6  ----------------------------<  106<H>  4.4   |  23.0  |  0.71    Ca    8.9      20 Nov 2021 09:34    TPro  7.3  /  Alb  4.3  /  TBili  0.3<L>  /  DBili  x   /  AST  21  /  ALT  29  /  AlkPhos  71  11-20      PT/INR - ( 20 Nov 2021 09:34 )   PT: 11.7 sec;   INR: 1.01 ratio         PTT - ( 20 Nov 2021 09:34 )  PTT:31.0 sec    RADIOLOGY & ADDITIONAL STUDIES (independently reviewed unless otherwise noted):  CT head- no stroke, mass or blood  CTA head: no aneurysm, AVM, LVO or sig stenosis in COW  CTA neck: no sig carotid or vertebral stenosis  CT Perfusion head - CBF<30% volume 0ml, Tmax>6s volume =0ml no

## 2021-11-20 NOTE — ED PROVIDER NOTE - PATIENT PORTAL LINK FT
You can access the FollowMyHealth Patient Portal offered by [facility] by registering at the following website: [email or web address]. By joining [employer]’s FollowMyHealth portal, you will also be able to view your health information using other applications (apps) compatible with our system.

## 2021-11-20 NOTE — ED PROVIDER NOTE - CARE PROVIDER_API CALL
Eliot Crowder; PhD)  Neurology; Vascular Neurology  [address]  [place] [address]  Phone: [phone]  Fax: [phone]  Follow Up Time: Urgent

## 2021-11-20 NOTE — ED PROVIDER NOTE - ATTENDING CONTRIBUTION TO CARE
Jesús: I performed a face to face evaluation of this patient and performed a full history and physical examination on the patient.  I agree with the resident's history, physical examination, and plan of the patient unless otherwise noted. My brief assessment is as follows: no pmh c/o sudden onset blurry/loss of vision to left lateral visiual field and feeling like she is floating starting at 8:35. no headache or dizziness. no other loss of vision, just can't see left periphery out of left eye. no weakness/numbness, no f/c, no recent illness, no other complaints. non toxic, ctab, rrr, abd benign, 5/5 strength and nl sensation. diminished vision to periphery with left eye, is blurry and can't determine number of fingers but can see some movement. no other vision changes. other cn 2-12 wnl. code stroke activated due to acute neuro change in time of onset but given lack of significant neuro deficit unlikely tpa candidate. telestroke Dr. eB in agreement, no tpa. NIHSS: 1 for peripheral vision

## 2021-11-20 NOTE — ED ADULT TRIAGE NOTE - CHIEF COMPLAINT QUOTE
pt a+ox3, ambulatory into ED c/o sudden onset of left peripheral vision loss and dizziness after using bathroom at 0835. pt reports feeling like she is floating, denies any pain or discomfort. MD Espinosa called for eval.

## 2022-01-18 ENCOUNTER — TRANSCRIPTION ENCOUNTER (OUTPATIENT)
Age: 37
End: 2022-01-18

## 2022-02-25 ENCOUNTER — APPOINTMENT (OUTPATIENT)
Dept: ORTHOPEDIC SURGERY | Facility: CLINIC | Age: 37
End: 2022-02-25

## 2022-04-27 ENCOUNTER — RESULT REVIEW (OUTPATIENT)
Age: 37
End: 2022-04-27

## 2022-05-11 ENCOUNTER — APPOINTMENT (OUTPATIENT)
Dept: MAMMOGRAPHY | Facility: CLINIC | Age: 37
End: 2022-05-11
Payer: COMMERCIAL

## 2022-05-11 ENCOUNTER — OUTPATIENT (OUTPATIENT)
Dept: OUTPATIENT SERVICES | Facility: HOSPITAL | Age: 37
LOS: 1 days | End: 2022-05-11
Payer: COMMERCIAL

## 2022-05-11 DIAGNOSIS — Z00.8 ENCOUNTER FOR OTHER GENERAL EXAMINATION: ICD-10-CM

## 2022-05-11 PROCEDURE — 77067 SCR MAMMO BI INCL CAD: CPT | Mod: 26

## 2022-05-11 PROCEDURE — 77063 BREAST TOMOSYNTHESIS BI: CPT

## 2022-05-11 PROCEDURE — 77063 BREAST TOMOSYNTHESIS BI: CPT | Mod: 26

## 2022-05-11 PROCEDURE — 77067 SCR MAMMO BI INCL CAD: CPT

## 2023-01-03 NOTE — ED ADULT NURSE NOTE - NS ED NURSE DC INFO COMPLEXITY
Future Appointments   Date Time Provider Judi Caruso   1/4/2023  8:15 AM Brittney Campbell MD [address]       Last appt on 3.17.2022 Verbalized Understanding/Simple: Patient demonstrates quick and easy understanding

## 2023-05-30 NOTE — ASU PREOP CHECKLIST - AS BP NONINV METHOD
NOTES FOR VISIT:   · Discussed with the patient treating the rosacea in episodic manner, minocycline 50 mg twice a day until clear, and then stopping for period of time then repeating treatment.  New script was sent to the pharmacy.    IMAGES:  No images are attached to the encounter.    FOLLOW-UP: Return if symptoms worsen or fail to improve, for Acne Rosacea.    Digna was seen today for office visit.    Diagnoses and all orders for this visit:    Acne rosacea  -     minocycline (Minocin) 50 MG capsule; 1 capsule twice daily until the acne rosacea clears, then 1 daily x 2 weeks, then stop and restart as needed         Medications Discontinued During This Encounter   Medication Reason   • minocycline (Minocin) 50 MG capsule Reorder        Chief Complaint   Patient presents with   • Office Visit     Acne rosacea follow up/refill       HISTORY: Digna is here today for:  · Presents for follow-up on acne rosacea.  She was last seen by us in April of 2021.  She would like a refill on minocycline.  She states she has good like treating for several weeks twice a day, and then once a day for a week or so and then stopping.    PAST DERMATOLOGY-SPECIFIC HISTORY:  Rosacea    ACTIVE DERMATOLOGY CONDITIONS AND PRESCRIPTIONS:  N/A    PAST MEDICAL HISTORY, PAST SURGICAL HISTORY, SOCIAL HISTORY, AND FAMILY HISTORY WERE REVIEWED.    PHYSICAL EXAMINATION: CONSTITUTIONAL:  Digna  is a 77 year old female who is well developed, well nourished, in no acute distress.  There were no vitals taken for this visit.. NEUROLOGIC AND PSYCHIATRIC: She has a normal mood and affect. SKIN: Complete examination, including palpation and careful visual examination of the area of interest revealed no other significant abnormality or eruption. I noted:    · Moves easily about the room.  Appears approximately her stated age.  She does wear glasses.    · Examination reveals small comedonal type eruptions on both of her cheeks and on her forehead.  She has  follicular centered eruptions present she has some erythema present    Ej Laguna, JANEL, NP, DCNP   electronic

## 2023-11-10 ENCOUNTER — APPOINTMENT (OUTPATIENT)
Dept: ORTHOPEDIC SURGERY | Facility: CLINIC | Age: 38
End: 2023-11-10

## 2024-03-21 NOTE — H&P PST ADULT - BACK
"Assessment/Plan   Problem List Items Addressed This Visit             ICD-10-CM    Hypertension I10     Other Visit Diagnoses         Codes    Skin exam for malignant neoplasm    -  Primary Z12.83    Relevant Orders    Referral to Dermatology    Encounter for screening mammogram for malignant neoplasm of breast     Z12.31    Relevant Orders    BI mammo bilateral screening tomosynthesis        Nystatin powder in the folds of inner thigh/groin area. Discussed with patient to avoid soaps with fragrance.   Discussed lubrication for vaginal dryness during intercourse.   Dermatology referral for routine check up.  Mammogram is due in May, order put in.     Leidy Elaine, APRN-CNM     Subjective   María Clifford is a 76 y.o. female who is here for a routine exam.   Itching in intertriginous folds started \"awhile ago\" and has subsided, used Monistat cream a few weeks ago. Denies burning with urination, abnormal discharge, bleeding. No fevers or chills.   \"Spots\" on pubic area she found after shaving. Denies itching or pain.   Mammogram due in May.    Lives with:   Current employment: retired, volunteer  T/E/D: none  Up to date vision/dental: yes  PCP: Dr Winn   Menstrual history: post menopausal, denies any PMB   Sexual history: yes,    Pregnancy history:   G/P  T:2 P: 0 EAB:  0 Ectopic:   0SAB: 1   L:      Diet: balanced,  is diabetic so they eat diabetic diet  Exercise: silver sneakers    PMH, PSH, and Family hx reviewed and updated    Current contraception: post menopausal status  Refill Y/N:    Last pap:   History of abnormal Pap smear: no  Family history of breast, uterine,  ovarian cancer: no  Regular self breast exam: no  Last mammogram: 2023 May  History of abnormal mammogram: no          Review of Systems   Constitutional: Negative.    HENT: Negative.     Eyes: Negative.    Respiratory: Negative.     Cardiovascular: Negative.    Gastrointestinal: Negative.  "   Endocrine: Negative.    Genitourinary: Negative.         Vulvar area itching   Musculoskeletal: Negative.    Skin: Negative.    Allergic/Immunologic: Negative.    Neurological: Negative.    Hematological: Negative.    Psychiatric/Behavioral: Negative.     All other systems reviewed and are negative.      Objective   There were no vitals taken for this visit.    Physical Exam  Vitals reviewed.   Constitutional:       Appearance: Normal appearance.   Cardiovascular:      Rate and Rhythm: Normal rate and regular rhythm.      Pulses: Normal pulses.      Heart sounds: Normal heart sounds.   Pulmonary:      Effort: Pulmonary effort is normal.      Breath sounds: Normal breath sounds.   Genitourinary:     General: Normal vulva.      Comments: Vulvar atrophy.  No erythema, edema, or discharge.  Intertriginous area is clear with no erythema, rash or irritation.   Skin:     Comments: Seborrheic keratosis noted on abd and pubic area.   Neurological:      Mental Status: She is alert.         No deformity or limitation of movement

## 2024-08-09 ENCOUNTER — APPOINTMENT (OUTPATIENT)
Dept: ORTHOPEDIC SURGERY | Facility: CLINIC | Age: 39
End: 2024-08-09

## 2025-01-17 ENCOUNTER — APPOINTMENT (OUTPATIENT)
Dept: ORTHOPEDIC SURGERY | Facility: CLINIC | Age: 40
End: 2025-01-17

## 2025-05-16 ENCOUNTER — APPOINTMENT (OUTPATIENT)
Dept: ORTHOPEDIC SURGERY | Facility: CLINIC | Age: 40
End: 2025-05-16

## 2025-05-22 NOTE — H&P PST ADULT - BREASTS
Occupational Therapy Visit    Visit Type: Daily Treatment Note  Visit: 2  Referring Provider: Jarod Gifford MD  Medical Diagnosis (from order): M25.532 - Left wrist pain  S66.812D - Rupture of extensor tendon of left hand, subsequent encounter  S52.502D - Closed fracture of distal end of left radius with routine healing, unspecified fracture morphology, subsequent encounter     Diagnosis Precautions: Surgery: Left Thumb Exterior Indices Proprius To Extensor Pollicis Longus Transfer - Left  Date of Surgery: 11/13/2024  Post Operation Day: 184    Chart reviewed at time of initial evaluation (relevant co-morbidities, allergies, tests and medications listed):  Past Medical History:  No date: Fracture      Comment:  Right tibia  2016: Right shoulder pain  11/7/2016: RT knee PMM 11/22/16  3/29/2016: RT sh ASD; DCR 3/29/16  Current Outpatient Medications:   compounded derm cream, 5-fluorouracil 5% cream - calcipotriene 0.005% cream. Apply twice daily to scalp, face, and nose for 10 days  acetaminophen-codeine (TYLENOL NO.3) 300-30 MG per tablet, Take 1 tablet by mouth every 6 hours as needed for Pain.  fexofenadine (ALLEGRA) 180 MG tablet, Take 180 mg by mouth daily.    SUBJECTIVE                                                                                                               The hand is really sore today. Had a huge problem with the wrist the last few days.      Pt had surgery of  Left Thumb Exterior Indices Proprius To Extensor Pollicis Longus Transfer on 11/13/2024. He was seen for therapy until February 2025.  He was doing well.  Continuing with HEP and strengthening.  Pt had moved to [place] for 3 months.  He just came back to [place].  He is having difficulty moving his thumb into composite flexion, has hardened tissue garry dorsal of thumb MC more on ulnar side of incision and weakness in  and pinch.     Pain / Symptoms  - Pain rating (out of 10): Current: 0 ; Best: 0; Worst: 8 (When geting of  emery)    Patient Goals: decreased pain, increased strength, increased motion, independence with ADLs/IADLs, decreased edema and return to sport/leisure activities.     OBJECTIVE                                                                                                                                            Treatment    Attended Electrical-Stimulation  - Purpose: pain relief and reduction of muscle spasm  - ~3 minutes in dorsal thumb, did not notice many trigger points    Ultrasound  - Location: US to thumb scar, dorsal wrist scar; wrist ext muscle groups as needed  - Position: sitting  - Duty Cycle: 50%  - Frequency: 3 Mhz  - Intensity (w/cm2): 1.0  - Duration: 8 minutes 8+8=10    Results: tissue softening  Reaction: no adverse reaction to treatment      Therapeutic Exercise  Patient has paused with putty, only used yellow at this point. Will bring in next session  Printed out HEP patient continuing to do at this time.   Notable tightness in thenar eminence and thenar webspace. Instructed in chip clip trigger point release for webspace and c-shape gentle stretch/isometric    Manual Therapy   IASTM to scars, thenar eminence, wrist extensors   Scar massage to all scars with and without tool, tender with tool     Skilled input: verbal instruction/cues    Writer verbally educated and received verbal consent for hand placement, positioning of patient, and techniques to be performed today from patient   Home Exercise Program  Access Code: [email or web address]  URL: [email or web address]  Date: 05/16/2025  Prepared by: Rell Major    Program Notes  heat - 5-10 minscar massage , forearm muscle massage by elbowexercises - start 5 reps 3x/dayice - 5-10 min for pain and swelling    Exercises  - Seated Elbow Manual Massage Perpendicular  - 3 sets - 5 reps - 3-5 hold  - Forearm Pronation PROM  - 3 sets - 5 reps - 3-5 hold  - Seated Wrist Flexion Extension PROM  - 3 sets - 5 reps - 3-5 hold  - Seated  Composite Thumb Flexion PROM  - 3 sets - 5 reps - 3-5 hold  - Wrist AROM Flexion Extension  - 3 sets - 5 reps - 3-5 hold  - Seated Wrist Radial and Ulnar Deviation AROM  - 3 sets - 5 reps - 3-5 hold  - Wrist Tendon Gliding  - 3 sets - 5 reps - 3-5 hold  - Thumb AROM MP Blocking  - 3 sets - 5 reps - 3-5 hold  - Seated Thumb IP Flexion AROM with Blocking  - 3 sets - 5 reps - 3-5 hold  - Seated Composite Thumb Flexion AROM  - 3 sets - 5 reps - 3-5 hold  - Thumb AROM Opposition  - 3 sets - 5 reps - 3-5 hold  - Putty Squeezes  - 7 x weekly - 1 sets - 10 reps  - Thumb Opposition with Putty  - 1 x daily - 7 x weekly - 10 reps  - Thumb MCP and IP Flexion with Putty  - 1 x daily - 7 x weekly - 10 reps  - Thumb Palmar Abduction with Putty Loop  - 1 x daily - 7 x weekly - 10 reps  - Thumb Radial Abduction with Putty Loop  - 1 x daily - 7 x weekly - 10 reps  - Key Pinch with Putty  - 1 x daily - 7 x weekly - 10 reps  - 3-Point Pinch with Putty  - 1 x daily - 7 x weekly - 10 reps      ASSESSMENT                                                                                                            Pt underwent  Left Thumb Exterior Indices Proprius To Extensor Pollicis Longus Transfer on 11/13/2024.  He was seen in therapy.  Progressing well.  Discharged in February 2025.  He moved to [place] for 3 months.  Has now returned to [place] and continues to have difficulty with thumb flexion, pain and weakness in  and pinch as well as wrist pain along radial wrist region. Cannot complete finkelstein's due to limited motion and pain and pull from scar region.   Education:   - Present and ready to learn: patient  - Results of above outlined education: Verbalizes understanding and Demonstrates understanding    PLAN                                                                                                                           Suggestions for next session as indicated: Progress per plan of care  US to thumb scar, dorsal  wrist scar.  Maybe add ext muscle groups as needed  STM to areas - use IASTM  Scar massage - Ktape, suction, cica care gel  P/AA/AROM forearm, wrist, thumb  Check strengthening  Consider D/N if patient becomes interested  Question ionto patch for thumb with inflammation? Possible de-quervain's involvement due to high pain and possible overuse  Ktape for thumb/wrist   CMC activation with IP flexion - small arc motion                 Therapy procedure time and total treatment time can be found documented on the Time Entry flowsheet   No masses; no nipple discharge